# Patient Record
Sex: MALE | Race: WHITE | NOT HISPANIC OR LATINO | Employment: UNEMPLOYED | URBAN - NONMETROPOLITAN AREA
[De-identification: names, ages, dates, MRNs, and addresses within clinical notes are randomized per-mention and may not be internally consistent; named-entity substitution may affect disease eponyms.]

---

## 2020-11-08 ENCOUNTER — APPOINTMENT (OUTPATIENT)
Dept: GENERAL RADIOLOGY | Facility: HOSPITAL | Age: 20
End: 2020-11-08

## 2020-11-08 ENCOUNTER — HOSPITAL ENCOUNTER (EMERGENCY)
Facility: HOSPITAL | Age: 20
Discharge: HOME | End: 2020-11-08
Attending: FAMILY MEDICINE | Admitting: FAMILY MEDICINE

## 2020-11-08 VITALS
DIASTOLIC BLOOD PRESSURE: 74 MMHG | OXYGEN SATURATION: 98 % | WEIGHT: 205 LBS | BODY MASS INDEX: 24.96 KG/M2 | HEIGHT: 76 IN | HEART RATE: 88 BPM | SYSTOLIC BLOOD PRESSURE: 123 MMHG | TEMPERATURE: 98.8 F | RESPIRATION RATE: 20 BRPM

## 2020-11-08 DIAGNOSIS — S93.402A SPRAIN OF LEFT ANKLE, UNSPECIFIED LIGAMENT, INITIAL ENCOUNTER: Primary | ICD-10-CM

## 2020-11-08 PROCEDURE — 73630 X-RAY EXAM OF FOOT: CPT

## 2020-11-08 PROCEDURE — 73610 X-RAY EXAM OF ANKLE: CPT

## 2020-11-08 PROCEDURE — 96372 THER/PROPH/DIAG INJ SC/IM: CPT

## 2020-11-08 PROCEDURE — 99283 EMERGENCY DEPT VISIT LOW MDM: CPT

## 2020-11-08 PROCEDURE — 25010000002 KETOROLAC TROMETHAMINE PER 15 MG: Performed by: FAMILY MEDICINE

## 2020-11-08 RX ORDER — MELOXICAM 15 MG/1
15 TABLET ORAL DAILY
Qty: 30 TABLET | Refills: 0 | Status: SHIPPED | OUTPATIENT
Start: 2020-11-08 | End: 2020-11-10

## 2020-11-08 RX ORDER — KETOROLAC TROMETHAMINE 30 MG/ML
60 INJECTION, SOLUTION INTRAMUSCULAR; INTRAVENOUS ONCE
Status: COMPLETED | OUTPATIENT
Start: 2020-11-08 | End: 2020-11-08

## 2020-11-08 RX ADMIN — KETOROLAC TROMETHAMINE 60 MG: 60 INJECTION, SOLUTION INTRAMUSCULAR at 14:59

## 2020-11-08 NOTE — ED PROVIDER NOTES
"Subjective   Patient states that roughly 1 hour prior to coming to the emergency department, he was playing basketball and went for rebound, and when he landed on his left ankle, he heard a \"pop.\"  He presents emergency department with left ankle swelling, and tenderness.      Leg Pain  Location:  Ankle and foot  Time since incident:  1 hour  Injury: yes    Mechanism of injury: fall    Fall:     Fall occurred:  Running    Height of fall:  GLF    Point of impact:  Unable to specify    Entrapped after fall: no    Ankle location:  L ankle  Foot location:  L foot  Pain details:     Quality:  Aching    Radiates to:  Does not radiate    Severity:  Moderate    Onset quality:  Sudden    Duration:  1 hour    Timing:  Constant    Progression:  Waxing and waning  Chronicity:  New  Dislocation: no    Foreign body present:  No foreign bodies  Prior injury to area:  No  Relieved by:  Nothing  Worsened by:  Bearing weight  Ineffective treatments:  None tried  Associated symptoms: decreased ROM and swelling    Associated symptoms: no fatigue, no fever and no neck pain    Risk factors: no frequent fractures and no obesity        Review of Systems   Constitutional: Negative for appetite change, chills, diaphoresis, fatigue and fever.   HENT: Negative for congestion, ear discharge, ear pain, nosebleeds, rhinorrhea, sinus pressure, sore throat and trouble swallowing.    Eyes: Negative for discharge and redness.   Respiratory: Negative for apnea, cough, chest tightness, shortness of breath and wheezing.    Cardiovascular: Negative for chest pain.   Gastrointestinal: Negative for abdominal pain, diarrhea, nausea and vomiting.   Endocrine: Negative for polyuria.   Genitourinary: Negative for dysuria, frequency and urgency.   Musculoskeletal: Positive for arthralgias and joint swelling. Negative for myalgias and neck pain.   Skin: Negative for color change and rash.   Allergic/Immunologic: Negative for immunocompromised state. "   Neurological: Negative for dizziness, seizures, syncope, weakness, light-headedness and headaches.   Hematological: Negative for adenopathy. Does not bruise/bleed easily.   Psychiatric/Behavioral: Negative for behavioral problems and confusion.   All other systems reviewed and are negative.      History reviewed. No pertinent past medical history.    Allergies   Allergen Reactions   • Zoloft [Sertraline Hcl] Other (See Comments)             History reviewed. No pertinent surgical history.    History reviewed. No pertinent family history.    Social History     Socioeconomic History   • Marital status: Single     Spouse name: Not on file   • Number of children: Not on file   • Years of education: Not on file   • Highest education level: Not on file   Tobacco Use   • Smoking status: Current Every Day Smoker     Types: Cigarettes   Substance and Sexual Activity   • Alcohol use: Not Currently   • Drug use: Not Currently           Objective   Physical Exam  Vitals signs and nursing note reviewed.   Constitutional:       Appearance: He is well-developed.   HENT:      Head: Normocephalic and atraumatic.      Nose: Nose normal.   Eyes:      General: No scleral icterus.        Right eye: No discharge.         Left eye: No discharge.      Conjunctiva/sclera: Conjunctivae normal.      Pupils: Pupils are equal, round, and reactive to light.   Neck:      Musculoskeletal: Normal range of motion and neck supple.      Trachea: No tracheal deviation.   Cardiovascular:      Rate and Rhythm: Normal rate and regular rhythm.      Heart sounds: Normal heart sounds. No murmur.   Pulmonary:      Effort: Pulmonary effort is normal. No respiratory distress.      Breath sounds: Normal breath sounds. No stridor. No wheezing or rales.   Abdominal:      General: Bowel sounds are normal. There is no distension.      Palpations: Abdomen is soft. There is no mass.      Tenderness: There is no abdominal tenderness. There is no guarding or rebound.    Musculoskeletal:      Left foot: Decreased range of motion. Tenderness, bony tenderness and swelling present. No crepitus or laceration.        Feet:    Skin:     General: Skin is warm and dry.      Findings: No erythema or rash.   Neurological:      Mental Status: He is alert and oriented to person, place, and time.      Coordination: Coordination normal.   Psychiatric:         Behavior: Behavior normal.         Thought Content: Thought content normal.         Procedures           ED Course                   Labs Reviewed - No data to display    XR Foot 3+ View Left   Final Result   Soft tissue swelling without acute osseous   abnormality identified.          Note:  if pain or symptoms persist beyond reasonable expectations      and follow-up imaging is anticipated,  cross sectional imaging    (CT and/or MRI) is suggested, as is deemed clinically    appropriate.      Electronically signed by:  Melissa Barnes MD  11/8/2020 3:18 PM CST   Workstation: [id number]      XR Ankle 3+ View Left   Final Result   Soft tissue swelling without acute osseous   abnormality identified.         Note:  if pain or symptoms persist beyond reasonable expectations      and follow-up imaging is anticipated,  cross sectional imaging    (CT and/or MRI) is suggested, as is deemed clinically    appropriate.      Electronically signed by:  Melissa Barnes MD  11/8/2020 3:16 PM CST   Workstation: [id number]                                       [facility]    Final diagnoses:   Sprain of left ankle, unspecified ligament, initial encounter            Anders Dover MD  11/08/20 7443

## 2020-11-10 ENCOUNTER — HOSPITAL ENCOUNTER (EMERGENCY)
Facility: HOSPITAL | Age: 20
Discharge: HOME | End: 2020-11-11
Attending: EMERGENCY MEDICINE | Admitting: EMERGENCY MEDICINE

## 2020-11-10 VITALS
TEMPERATURE: 98.4 F | RESPIRATION RATE: 17 BRPM | BODY MASS INDEX: 24.96 KG/M2 | DIASTOLIC BLOOD PRESSURE: 57 MMHG | OXYGEN SATURATION: 99 % | HEIGHT: 76 IN | SYSTOLIC BLOOD PRESSURE: 128 MMHG | WEIGHT: 205 LBS | HEART RATE: 60 BPM

## 2020-11-10 DIAGNOSIS — F32.A DEPRESSION WITH SUICIDAL IDEATION: Primary | ICD-10-CM

## 2020-11-10 DIAGNOSIS — R45.851 DEPRESSION WITH SUICIDAL IDEATION: Primary | ICD-10-CM

## 2020-11-10 LAB
ALBUMIN SERPL-MCNC: 4.5 G/DL (ref 3.5–5.2)
ALBUMIN/GLOB SERPL: 1.9 G/DL
ALP SERPL-CCNC: 74 U/L (ref 39–117)
ALT SERPL W P-5'-P-CCNC: 15 U/L (ref 1–41)
AMPHET+METHAMPHET UR QL: NEGATIVE
AMPHETAMINES UR QL: NEGATIVE
ANION GAP SERPL CALCULATED.3IONS-SCNC: 11 MMOL/L (ref 5–15)
APAP SERPL-MCNC: <5 MCG/ML (ref 0–30)
AST SERPL-CCNC: 23 U/L (ref 1–40)
BARBITURATES UR QL SCN: NEGATIVE
BASOPHILS # BLD AUTO: 0.03 10*3/MM3 (ref 0–0.2)
BASOPHILS NFR BLD AUTO: 0.4 % (ref 0–1.5)
BENZODIAZ UR QL SCN: NEGATIVE
BILIRUB SERPL-MCNC: 0.5 MG/DL (ref 0–1.2)
BILIRUB UR QL STRIP: NEGATIVE
BUN SERPL-MCNC: 13 MG/DL (ref 6–20)
BUN/CREAT SERPL: 16.7 (ref 7–25)
BUPRENORPHINE SERPL-MCNC: NEGATIVE NG/ML
CALCIUM SPEC-SCNC: 9.6 MG/DL (ref 8.6–10.5)
CANNABINOIDS SERPL QL: NEGATIVE
CHLORIDE SERPL-SCNC: 104 MMOL/L (ref 98–107)
CLARITY UR: CLEAR
CO2 SERPL-SCNC: 25 MMOL/L (ref 22–29)
COCAINE UR QL: NEGATIVE
COLOR UR: YELLOW
CREAT SERPL-MCNC: 0.78 MG/DL (ref 0.76–1.27)
DEPRECATED RDW RBC AUTO: 41.1 FL (ref 37–54)
EOSINOPHIL # BLD AUTO: 0.17 10*3/MM3 (ref 0–0.4)
EOSINOPHIL NFR BLD AUTO: 2.5 % (ref 0.3–6.2)
ERYTHROCYTE [DISTWIDTH] IN BLOOD BY AUTOMATED COUNT: 11.8 % (ref 12.3–15.4)
ETHANOL BLD-MCNC: <10 MG/DL (ref 0–10)
ETHANOL UR QL: <0.01 %
GFR SERPL CREATININE-BSD FRML MDRD: 127 ML/MIN/1.73
GLOBULIN UR ELPH-MCNC: 2.4 GM/DL
GLUCOSE SERPL-MCNC: 97 MG/DL (ref 65–99)
GLUCOSE UR STRIP-MCNC: NEGATIVE MG/DL
HCT VFR BLD AUTO: 37.5 % (ref 37.5–51)
HGB BLD-MCNC: 13 G/DL (ref 13–17.7)
HGB UR QL STRIP.AUTO: NEGATIVE
HOLD SPECIMEN: NORMAL
IMM GRANULOCYTES # BLD AUTO: 0.01 10*3/MM3 (ref 0–0.05)
IMM GRANULOCYTES NFR BLD AUTO: 0.1 % (ref 0–0.5)
KETONES UR QL STRIP: ABNORMAL
LEUKOCYTE ESTERASE UR QL STRIP.AUTO: NEGATIVE
LYMPHOCYTES # BLD AUTO: 2.25 10*3/MM3 (ref 0.7–3.1)
LYMPHOCYTES NFR BLD AUTO: 32.6 % (ref 19.6–45.3)
MCH RBC QN AUTO: 32.3 PG (ref 26.6–33)
MCHC RBC AUTO-ENTMCNC: 34.7 G/DL (ref 31.5–35.7)
MCV RBC AUTO: 93.3 FL (ref 79–97)
METHADONE UR QL SCN: NEGATIVE
MONOCYTES # BLD AUTO: 0.61 10*3/MM3 (ref 0.1–0.9)
MONOCYTES NFR BLD AUTO: 8.8 % (ref 5–12)
NEUTROPHILS NFR BLD AUTO: 3.84 10*3/MM3 (ref 1.7–7)
NEUTROPHILS NFR BLD AUTO: 55.6 % (ref 42.7–76)
NITRITE UR QL STRIP: NEGATIVE
NRBC BLD AUTO-RTO: 0 /100 WBC (ref 0–0.2)
OPIATES UR QL: NEGATIVE
OXYCODONE UR QL SCN: NEGATIVE
PCP UR QL SCN: NEGATIVE
PH UR STRIP.AUTO: 6 [PH] (ref 5–9)
PLATELET # BLD AUTO: 276 10*3/MM3 (ref 140–450)
PMV BLD AUTO: 9.9 FL (ref 6–12)
POTASSIUM SERPL-SCNC: 4.2 MMOL/L (ref 3.5–5.2)
PROPOXYPH UR QL: NEGATIVE
PROT SERPL-MCNC: 6.9 G/DL (ref 6–8.5)
PROT UR QL STRIP: NEGATIVE
RBC # BLD AUTO: 4.02 10*6/MM3 (ref 4.14–5.8)
SALICYLATES SERPL-MCNC: <0.3 MG/DL
SODIUM SERPL-SCNC: 140 MMOL/L (ref 136–145)
SP GR UR STRIP: 1.01 (ref 1–1.03)
TRICYCLICS UR QL SCN: NEGATIVE
UROBILINOGEN UR QL STRIP: ABNORMAL
WBC # BLD AUTO: 6.91 10*3/MM3 (ref 3.4–10.8)
WHOLE BLOOD HOLD SPECIMEN: NORMAL

## 2020-11-10 PROCEDURE — 85025 COMPLETE CBC W/AUTO DIFF WBC: CPT | Performed by: EMERGENCY MEDICINE

## 2020-11-10 PROCEDURE — 80307 DRUG TEST PRSMV CHEM ANLYZR: CPT | Performed by: EMERGENCY MEDICINE

## 2020-11-10 PROCEDURE — 99284 EMERGENCY DEPT VISIT MOD MDM: CPT

## 2020-11-10 PROCEDURE — 81003 URINALYSIS AUTO W/O SCOPE: CPT | Performed by: EMERGENCY MEDICINE

## 2020-11-10 PROCEDURE — 80053 COMPREHEN METABOLIC PANEL: CPT | Performed by: EMERGENCY MEDICINE

## 2020-11-10 RX ORDER — BUPROPION HYDROCHLORIDE 150 MG/1
150 TABLET, EXTENDED RELEASE ORAL ONCE
Status: COMPLETED | OUTPATIENT
Start: 2020-11-10 | End: 2020-11-11

## 2020-11-10 RX ORDER — BUPROPION HYDROCHLORIDE 150 MG/1
150 TABLET, EXTENDED RELEASE ORAL DAILY
Qty: 10 TABLET | Refills: 0 | Status: SHIPPED | OUTPATIENT
Start: 2020-11-10 | End: 2020-11-16 | Stop reason: HOSPADM

## 2020-11-11 ENCOUNTER — HOSPITAL ENCOUNTER (INPATIENT)
Facility: HOSPITAL | Age: 20
LOS: 5 days | Discharge: HOME | End: 2020-11-16
Attending: PSYCHIATRY & NEUROLOGY | Admitting: PSYCHIATRY & NEUROLOGY

## 2020-11-11 ENCOUNTER — HOSPITAL ENCOUNTER (EMERGENCY)
Facility: HOSPITAL | Age: 20
Discharge: PSYCHIATRIC OR SUBSTANCE USE FACILITY | End: 2020-11-11
Attending: EMERGENCY MEDICINE

## 2020-11-11 VITALS
DIASTOLIC BLOOD PRESSURE: 73 MMHG | BODY MASS INDEX: 24.96 KG/M2 | OXYGEN SATURATION: 98 % | WEIGHT: 205 LBS | HEART RATE: 63 BPM | RESPIRATION RATE: 18 BRPM | TEMPERATURE: 97.5 F | SYSTOLIC BLOOD PRESSURE: 126 MMHG | HEIGHT: 76 IN

## 2020-11-11 DIAGNOSIS — F32.A DEPRESSION, UNSPECIFIED DEPRESSION TYPE: Primary | ICD-10-CM

## 2020-11-11 DIAGNOSIS — R45.851 SUICIDAL IDEATION: ICD-10-CM

## 2020-11-11 LAB
ALBUMIN SERPL-MCNC: 4.9 G/DL (ref 3.5–5.2)
ALBUMIN/GLOB SERPL: 1.8 G/DL
ALP SERPL-CCNC: 84 U/L (ref 39–117)
ALT SERPL W P-5'-P-CCNC: 19 U/L (ref 1–41)
AMPHET+METHAMPHET UR QL: NEGATIVE
AMPHETAMINES UR QL: NEGATIVE
ANION GAP SERPL CALCULATED.3IONS-SCNC: 10 MMOL/L (ref 5–15)
APAP SERPL-MCNC: <5 MCG/ML (ref 0–30)
AST SERPL-CCNC: 24 U/L (ref 1–40)
B PARAPERT DNA SPEC QL NAA+PROBE: NOT DETECTED
B PERT DNA SPEC QL NAA+PROBE: NOT DETECTED
BARBITURATES UR QL SCN: NEGATIVE
BASOPHILS # BLD AUTO: 0.04 10*3/MM3 (ref 0–0.2)
BASOPHILS NFR BLD AUTO: 0.5 % (ref 0–1.5)
BENZODIAZ UR QL SCN: NEGATIVE
BILIRUB SERPL-MCNC: 0.5 MG/DL (ref 0–1.2)
BILIRUB UR QL STRIP: NEGATIVE
BUN SERPL-MCNC: 12 MG/DL (ref 6–20)
BUN/CREAT SERPL: 16.7 (ref 7–25)
BUPRENORPHINE SERPL-MCNC: NEGATIVE NG/ML
C PNEUM DNA NPH QL NAA+NON-PROBE: NOT DETECTED
CALCIUM SPEC-SCNC: 9.9 MG/DL (ref 8.6–10.5)
CANNABINOIDS SERPL QL: NEGATIVE
CHLORIDE SERPL-SCNC: 104 MMOL/L (ref 98–107)
CLARITY UR: CLEAR
CO2 SERPL-SCNC: 26 MMOL/L (ref 22–29)
COCAINE UR QL: NEGATIVE
COLOR UR: YELLOW
CREAT SERPL-MCNC: 0.72 MG/DL (ref 0.76–1.27)
DEPRECATED RDW RBC AUTO: 39.8 FL (ref 37–54)
EOSINOPHIL # BLD AUTO: 0.17 10*3/MM3 (ref 0–0.4)
EOSINOPHIL NFR BLD AUTO: 2.1 % (ref 0.3–6.2)
ERYTHROCYTE [DISTWIDTH] IN BLOOD BY AUTOMATED COUNT: 11.8 % (ref 12.3–15.4)
ETHANOL BLD-MCNC: <10 MG/DL (ref 0–10)
ETHANOL UR QL: <0.01 %
FLUAV H1 2009 PAND RNA NPH QL NAA+PROBE: NOT DETECTED
FLUAV H1 HA GENE NPH QL NAA+PROBE: NOT DETECTED
FLUAV H3 RNA NPH QL NAA+PROBE: NOT DETECTED
FLUAV SUBTYP SPEC NAA+PROBE: NOT DETECTED
FLUBV RNA ISLT QL NAA+PROBE: NOT DETECTED
GFR SERPL CREATININE-BSD FRML MDRD: 139 ML/MIN/1.73
GLOBULIN UR ELPH-MCNC: 2.8 GM/DL
GLUCOSE SERPL-MCNC: 95 MG/DL (ref 65–99)
GLUCOSE UR STRIP-MCNC: NEGATIVE MG/DL
HADV DNA SPEC NAA+PROBE: NOT DETECTED
HCOV 229E RNA SPEC QL NAA+PROBE: NOT DETECTED
HCOV HKU1 RNA SPEC QL NAA+PROBE: NOT DETECTED
HCOV NL63 RNA SPEC QL NAA+PROBE: NOT DETECTED
HCOV OC43 RNA SPEC QL NAA+PROBE: NOT DETECTED
HCT VFR BLD AUTO: 41.8 % (ref 37.5–51)
HGB BLD-MCNC: 14.8 G/DL (ref 13–17.7)
HGB UR QL STRIP.AUTO: NEGATIVE
HMPV RNA NPH QL NAA+NON-PROBE: NOT DETECTED
HOLD SPECIMEN: NORMAL
HPIV1 RNA SPEC QL NAA+PROBE: NOT DETECTED
HPIV2 RNA SPEC QL NAA+PROBE: NOT DETECTED
HPIV3 RNA NPH QL NAA+PROBE: NOT DETECTED
HPIV4 P GENE NPH QL NAA+PROBE: NOT DETECTED
IMM GRANULOCYTES # BLD AUTO: 0.03 10*3/MM3 (ref 0–0.05)
IMM GRANULOCYTES NFR BLD AUTO: 0.4 % (ref 0–0.5)
KETONES UR QL STRIP: NEGATIVE
LEUKOCYTE ESTERASE UR QL STRIP.AUTO: NEGATIVE
LYMPHOCYTES # BLD AUTO: 1.63 10*3/MM3 (ref 0.7–3.1)
LYMPHOCYTES NFR BLD AUTO: 20.5 % (ref 19.6–45.3)
M PNEUMO IGG SER IA-ACNC: NOT DETECTED
MCH RBC QN AUTO: 32.4 PG (ref 26.6–33)
MCHC RBC AUTO-ENTMCNC: 35.4 G/DL (ref 31.5–35.7)
MCV RBC AUTO: 91.5 FL (ref 79–97)
METHADONE UR QL SCN: NEGATIVE
MONOCYTES # BLD AUTO: 0.61 10*3/MM3 (ref 0.1–0.9)
MONOCYTES NFR BLD AUTO: 7.7 % (ref 5–12)
NEUTROPHILS NFR BLD AUTO: 5.48 10*3/MM3 (ref 1.7–7)
NEUTROPHILS NFR BLD AUTO: 68.8 % (ref 42.7–76)
NITRITE UR QL STRIP: NEGATIVE
NRBC BLD AUTO-RTO: 0 /100 WBC (ref 0–0.2)
OPIATES UR QL: NEGATIVE
OXYCODONE UR QL SCN: NEGATIVE
PCP UR QL SCN: NEGATIVE
PH UR STRIP.AUTO: 6 [PH] (ref 5–9)
PLATELET # BLD AUTO: 297 10*3/MM3 (ref 140–450)
PMV BLD AUTO: 10.2 FL (ref 6–12)
POTASSIUM SERPL-SCNC: 3.8 MMOL/L (ref 3.5–5.2)
PROPOXYPH UR QL: NEGATIVE
PROT SERPL-MCNC: 7.7 G/DL (ref 6–8.5)
PROT UR QL STRIP: NEGATIVE
RBC # BLD AUTO: 4.57 10*6/MM3 (ref 4.14–5.8)
RHINOVIRUS RNA SPEC NAA+PROBE: NOT DETECTED
RSV RNA NPH QL NAA+NON-PROBE: NOT DETECTED
SALICYLATES SERPL-MCNC: 0.4 MG/DL
SARS-COV-2 RNA NPH QL NAA+NON-PROBE: NOT DETECTED
SODIUM SERPL-SCNC: 140 MMOL/L (ref 136–145)
SP GR UR STRIP: 1 (ref 1–1.03)
TRICYCLICS UR QL SCN: NEGATIVE
UROBILINOGEN UR QL STRIP: ABNORMAL
WBC # BLD AUTO: 7.96 10*3/MM3 (ref 3.4–10.8)
WHOLE BLOOD HOLD SPECIMEN: NORMAL

## 2020-11-11 PROCEDURE — 80307 DRUG TEST PRSMV CHEM ANLYZR: CPT | Performed by: EMERGENCY MEDICINE

## 2020-11-11 PROCEDURE — 80053 COMPREHEN METABOLIC PANEL: CPT | Performed by: EMERGENCY MEDICINE

## 2020-11-11 PROCEDURE — 0202U NFCT DS 22 TRGT SARS-COV-2: CPT | Performed by: EMERGENCY MEDICINE

## 2020-11-11 PROCEDURE — 81003 URINALYSIS AUTO W/O SCOPE: CPT | Performed by: EMERGENCY MEDICINE

## 2020-11-11 PROCEDURE — 85025 COMPLETE CBC W/AUTO DIFF WBC: CPT | Performed by: EMERGENCY MEDICINE

## 2020-11-11 RX ORDER — NICOTINE 21 MG/24HR
1 PATCH, TRANSDERMAL 24 HOURS TRANSDERMAL EVERY 24 HOURS
Status: DISCONTINUED | OUTPATIENT
Start: 2020-11-12 | End: 2020-11-12

## 2020-11-11 RX ORDER — LOPERAMIDE HYDROCHLORIDE 2 MG/1
2 CAPSULE ORAL
Status: DISCONTINUED | OUTPATIENT
Start: 2020-11-11 | End: 2020-11-16 | Stop reason: HOSPADM

## 2020-11-11 RX ORDER — ACETAMINOPHEN 325 MG/1
650 TABLET ORAL EVERY 4 HOURS PRN
Status: DISCONTINUED | OUTPATIENT
Start: 2020-11-11 | End: 2020-11-16 | Stop reason: HOSPADM

## 2020-11-11 RX ORDER — ALUMINA, MAGNESIA, AND SIMETHICONE 2400; 2400; 240 MG/30ML; MG/30ML; MG/30ML
15 SUSPENSION ORAL EVERY 6 HOURS PRN
Status: DISCONTINUED | OUTPATIENT
Start: 2020-11-11 | End: 2020-11-16 | Stop reason: HOSPADM

## 2020-11-11 RX ORDER — TRAZODONE HYDROCHLORIDE 50 MG/1
50 TABLET ORAL NIGHTLY PRN
Status: DISCONTINUED | OUTPATIENT
Start: 2020-11-11 | End: 2020-11-12

## 2020-11-11 RX ORDER — HYDROXYZINE PAMOATE 50 MG/1
50 CAPSULE ORAL EVERY 6 HOURS PRN
Status: DISCONTINUED | OUTPATIENT
Start: 2020-11-11 | End: 2020-11-16 | Stop reason: HOSPADM

## 2020-11-11 RX ORDER — CLONIDINE HYDROCHLORIDE 0.1 MG/1
0.1 TABLET ORAL EVERY 4 HOURS PRN
Status: DISCONTINUED | OUTPATIENT
Start: 2020-11-11 | End: 2020-11-16 | Stop reason: HOSPADM

## 2020-11-11 RX ADMIN — BUPROPION HYDROCHLORIDE 150 MG: 150 TABLET, EXTENDED RELEASE ORAL at 00:11

## 2020-11-11 NOTE — ED PROVIDER NOTES
Subjective   20-year-old male with reported history of depression and anxiety not currently on any medications presents the emergency department with complaint of suicidal ideation.  Denies any specific plan.  Reports he has been declining since he has been unable to take his Wellbutrin.  Reports that where he is staying at does not allow him his Wellbutrin.  Presents here for evaluation for depression and suicidal ideation.  He is living at [facility].    Family history, surgical history, social history, current medications and allergies are reviewed with the patient and triage documentation and vitals are reviewed.      History provided by:  Patient and medical records   used: No        Review of Systems   Constitutional: Negative for chills and fever.   HENT: Negative for congestion and sore throat.    Eyes: Negative for photophobia and visual disturbance.   Respiratory: Negative for cough, shortness of breath and wheezing.    Cardiovascular: Negative for chest pain, palpitations and leg swelling.   Gastrointestinal: Negative for abdominal pain, diarrhea, nausea and vomiting.   Endocrine: Negative.    Genitourinary: Negative for dysuria, frequency and urgency.   Musculoskeletal: Negative for arthralgias, back pain, myalgias and neck pain.   Skin: Negative for rash and wound.   Allergic/Immunologic: Negative.    Neurological: Negative for weakness, light-headedness, numbness and headaches.   Hematological: Negative.    Psychiatric/Behavioral: Positive for dysphoric mood and suicidal ideas. Negative for behavioral problems, hallucinations, self-injury and sleep disturbance. The patient is nervous/anxious.        Past Medical History:   Diagnosis Date   • Anxiety    • Depression        Allergies   Allergen Reactions   • Zoloft [Sertraline Hcl] Other (See Comments)             History reviewed. No pertinent surgical history.    History reviewed. No pertinent family history.    Social History      Socioeconomic History   • Marital status: Single     Spouse name: Not on file   • Number of children: Not on file   • Years of education: Not on file   • Highest education level: Not on file   Tobacco Use   • Smoking status: Never Smoker   Substance and Sexual Activity   • Alcohol use: Not Currently   • Drug use: Not Currently           Objective   Physical Exam  Vitals signs and nursing note reviewed.   Constitutional:       General: He is not in acute distress.     Appearance: Normal appearance. He is normal weight. He is not ill-appearing, toxic-appearing or diaphoretic.   HENT:      Head: Normocephalic and atraumatic.   Eyes:      Conjunctiva/sclera: Conjunctivae normal.      Pupils: Pupils are equal, round, and reactive to light.   Neck:      Musculoskeletal: Normal range of motion and neck supple.   Cardiovascular:      Rate and Rhythm: Normal rate and regular rhythm.      Pulses: Normal pulses.      Heart sounds: No murmur.   Pulmonary:      Effort: Pulmonary effort is normal.      Breath sounds: Normal breath sounds.   Abdominal:      General: Bowel sounds are normal.      Palpations: Abdomen is soft.   Musculoskeletal: Normal range of motion.   Skin:     General: Skin is warm and dry.      Capillary Refill: Capillary refill takes less than 2 seconds.   Neurological:      Mental Status: He is alert and oriented to person, place, and time.   Psychiatric:         Attention and Perception: Attention normal. He does not perceive auditory or visual hallucinations.         Mood and Affect: Mood is anxious. Affect is flat.         Speech: Speech normal.         Behavior: Behavior normal. Behavior is cooperative.         Thought Content: Thought content is not paranoid or delusional. Thought content includes suicidal ideation. Thought content does not include homicidal ideation. Thought content does not include homicidal or suicidal plan.         Cognition and Memory: Cognition and memory normal.          Procedures  none         ED Course  ED Course as of Nov 10 2347   Tue Nov 10, 2020   2343 Patient was signed out to me at shift change at 2300 by Dr. Cherry.  She medically cleared the patient.  Patient was evaluated by Tyra sosa [facility].  She will provide a safety plan.  She recommended we restart the patient on Wellbutrin.    [DR]      ED Course User Index  [DR] Eros Sam MD      Labs Reviewed   URINALYSIS W/ MICROSCOPIC IF INDICATED (NO CULTURE) - Abnormal; Notable for the following components:       Result Value    Ketones, UA 40 mg/dL (2+) (*)     All other components within normal limits    Narrative:     Urine microscopic not indicated.   CBC WITH AUTO DIFFERENTIAL - Abnormal; Notable for the following components:    RBC 4.02 (*)     RDW 11.8 (*)     All other components within normal limits   URINE DRUG SCREEN - Normal    Narrative:     Cutoff For Drugs Screened:    Amphetamines               500 ng/ml  Barbiturates               200 ng/ml  Benzodiazepines            150 ng/ml  Cocaine                    150 ng/ml  Methadone                  200 ng/ml  Opiates                    100 ng/ml  Phencyclidine               25 ng/ml  THC                            50 ng/ml  Methamphetamine            500 ng/ml  Tricyclic Antidepressants  300 ng/ml  Oxycodone                  100 ng/ml  Propoxyphene               300 ng/ml  Buprenorphine               10 ng/ml    The normal value for all drugs tested is negative. This report includes unconfirmed screening results, with the cutoff values listed, to be used for medical treatment purposes only.  Unconfirmed results must not be used for non-medical purposes such as employment or legal testing.  Clinical consideration should be applied to any drug of abuse test, particularly when unconfirmed results are used.     ACETAMINOPHEN LEVEL - Normal   SALICYLATE LEVEL - Normal   COMPREHENSIVE METABOLIC PANEL    Narrative:     GFR Normal >60  Chronic Kidney  Disease <60  Kidney Failure <15     ETHANOL   CBC AND DIFFERENTIAL    Narrative:     The following orders were created for panel order CBC & Differential.  Procedure                               Abnormality         Status                     ---------                               -----------         ------                     CBC Auto Differential[[id number]]        Abnormal            Final result                 Please view results for these tests on the individual orders.   EXTRA TUBES    Narrative:     The following orders were created for panel order Extra Tubes.  Procedure                               Abnormality         Status                     ---------                               -----------         ------                     Light Blue Top[[id number]]                                   Final result               Gold Top - SST[[id number]]                                   Final result                 Please view results for these tests on the individual orders.   LIGHT BLUE TOP   GOLD TOP - SST     Xr Ankle 3+ View Left    Result Date: 11/8/2020  Narrative: PROCEDURE: XR ANKLE 3+ VW LEFT VIEWS: 3 INDICATION: Injury, twisting injury COMPARISON: None FINDINGS:   - fracture: None   - alignment: Within normal limits   - misc: Soft tissue swelling overlies the lateral malleolus. There may be a very small joint effusion     Impression: Soft tissue swelling without acute osseous abnormality identified. Note:  if pain or symptoms persist beyond reasonable expectations and follow-up imaging is anticipated,  cross sectional imaging  (CT and/or MRI) is suggested, as is deemed clinically appropriate. Electronically signed by:  Melissa Barnes MD  11/8/2020 3:16 PM [facility] Workstation: [id number]    Xr Foot 3+ View Left    Result Date: 11/8/2020  Narrative: PROCEDURE: XR FOOT 3+ VW LEFT VIEWS: 3 INDICATION: Pain COMPARISON: None FINDINGS:   - fracture: None   - alignment: Within normal limits   - misc: Soft tissue  swelling overlies the lateral malleolus. There may be a small joint effusion at the tibiotalar joint     Impression: Soft tissue swelling without acute osseous abnormality identified. Note:  if pain or symptoms persist beyond reasonable expectations and follow-up imaging is anticipated,  cross sectional imaging  (CT and/or MRI) is suggested, as is deemed clinically appropriate. Electronically signed by:  Melissa Barnes MD  11/8/2020 3:18 PM [facility] Workstation: [id number]          MDM  Number of Diagnoses or Management Options     Amount and/or Complexity of Data Reviewed  Clinical lab tests: reviewed  Discuss the patient with other providers: yes    Patient Progress  Patient progress: stable    2300 patient has been medically cleared.  Due to lack of available beds patient is evaluated by [facility].  At the time of the end of my shift patient is actively being evaluated.  Patient will be signed out to Dr. Sam.  Please see his documentation for final disposition.    Final diagnoses:   Depression with suicidal ideation            Eros Sam MD  11/10/20 8229

## 2020-11-11 NOTE — ED NOTES
Pt taken to Conference room with sitter for [facility] evaluation     Trish Austin, RN  11/10/20 [id number]

## 2020-11-11 NOTE — ED NOTES
Pt called [facility] at facility after this RN attempted calling facility with no answer.  Colt sts to pt he will get someone here to pick him up.  Charge RN sts sitter is relieved and pt may be discharged to waiting to await ride.     Trish Austin, RN  11/11/20 0008

## 2020-11-12 PROBLEM — F15.90 STIMULANT USE DISORDER: Status: ACTIVE | Noted: 2020-11-12

## 2020-11-12 PROBLEM — F33.2 MDD (MAJOR DEPRESSIVE DISORDER), RECURRENT SEVERE, WITHOUT PSYCHOSIS (HCC): Status: ACTIVE | Noted: 2020-11-12

## 2020-11-12 PROBLEM — F32.A ACUTE DEPRESSION: Status: ACTIVE | Noted: 2020-11-12

## 2020-11-12 PROBLEM — F19.94 SUBSTANCE INDUCED MOOD DISORDER (HCC): Status: ACTIVE | Noted: 2020-11-12

## 2020-11-12 LAB
CHOLEST SERPL-MCNC: 137 MG/DL (ref 0–200)
GLUCOSE P FAST SERPL-MCNC: 107 MG/DL (ref 74–106)
HDLC SERPL-MCNC: 44 MG/DL (ref 40–60)
HOLD SPECIMEN: NORMAL
LDLC SERPL CALC-MCNC: 81 MG/DL (ref 0–100)
LDLC/HDLC SERPL: 1.85 {RATIO}
QT INTERVAL: 418 MS
QTC INTERVAL: 396 MS
TRIGL SERPL-MCNC: 58 MG/DL (ref 0–150)
VLDLC SERPL-MCNC: 12 MG/DL (ref 5–40)
WHOLE BLOOD HOLD SPECIMEN: NORMAL

## 2020-11-12 PROCEDURE — 99223 1ST HOSP IP/OBS HIGH 75: CPT | Performed by: PSYCHIATRY & NEUROLOGY

## 2020-11-12 PROCEDURE — 82947 ASSAY GLUCOSE BLOOD QUANT: CPT | Performed by: PSYCHIATRY & NEUROLOGY

## 2020-11-12 PROCEDURE — 93010 ELECTROCARDIOGRAM REPORT: CPT | Performed by: INTERNAL MEDICINE

## 2020-11-12 PROCEDURE — 80061 LIPID PANEL: CPT | Performed by: PSYCHIATRY & NEUROLOGY

## 2020-11-12 PROCEDURE — 93005 ELECTROCARDIOGRAM TRACING: CPT | Performed by: PSYCHIATRY & NEUROLOGY

## 2020-11-12 RX ORDER — LANOLIN ALCOHOL/MO/W.PET/CERES
1.5 CREAM (GRAM) TOPICAL DAILY PRN
Status: DISCONTINUED | OUTPATIENT
Start: 2020-11-12 | End: 2020-11-16 | Stop reason: HOSPADM

## 2020-11-12 RX ORDER — MIRTAZAPINE 15 MG/1
15 TABLET, FILM COATED ORAL NIGHTLY
Status: DISCONTINUED | OUTPATIENT
Start: 2020-11-12 | End: 2020-11-16 | Stop reason: HOSPADM

## 2020-11-12 RX ORDER — BUPROPION HYDROCHLORIDE 100 MG/1
100 TABLET, EXTENDED RELEASE ORAL DAILY
Status: COMPLETED | OUTPATIENT
Start: 2020-11-12 | End: 2020-11-12

## 2020-11-12 RX ORDER — BUPROPION HYDROCHLORIDE 150 MG/1
150 TABLET ORAL DAILY
Status: DISCONTINUED | OUTPATIENT
Start: 2020-11-13 | End: 2020-11-16 | Stop reason: HOSPADM

## 2020-11-12 RX ADMIN — MELATONIN 1.5 MG: at 20:42

## 2020-11-12 RX ADMIN — HYDROXYZINE PAMOATE 50 MG: 50 CAPSULE ORAL at 20:41

## 2020-11-12 RX ADMIN — MIRTAZAPINE 15 MG: 15 TABLET, FILM COATED ORAL at 20:42

## 2020-11-12 RX ADMIN — NICOTINE POLACRILEX 2 MG: 2 GUM, CHEWING BUCCAL at 20:42

## 2020-11-12 RX ADMIN — BUPROPION HYDROCHLORIDE 100 MG: 100 TABLET, EXTENDED RELEASE ORAL at 12:26

## 2020-11-12 RX ADMIN — NICOTINE POLACRILEX 2 MG: 2 GUM, CHEWING BUCCAL at 12:38

## 2020-11-12 RX ADMIN — NICOTINE 1 PATCH: 14 PATCH, EXTENDED RELEASE TRANSDERMAL at 08:28

## 2020-11-12 NOTE — PLAN OF CARE
Goal Outcome Evaluation:  Plan of Care Reviewed With: patient  Progress: no change  Outcome Summary: New admit tonight. patient has slept approx 4 hours tonight with no issues. Will continue to monitor.

## 2020-11-12 NOTE — PLAN OF CARE
"LCSW met with pt 1:1 and completed psychosocial assessment. Pt presented to the interview room, casually dressed, alert and oriented x3. Mood is depressed and hopeless, affect is blunted. Pt makes fair eye contact, speech is normal. Pt is cooperative with assessment, engages fairly well. Re: Reason for admission, pt stated \"I got out of [facility] a few weeks ago and went to [facility]. They wont let me take my medicine there and things have just gotten bad. I started having suicidal thoughts and I tried to cut myself the other day.\" Pt notes that he initially was brought to the ED two days ago but \"lied because I didn't want to get admitted\" and was sent back to [facility]. Pt reports that he continued to have SI so he came back to the hospital. Pt notes \"I just dont want to live. I want to go to sleep and not wake up. I dont feel like I have any options other than suicide or [facility].\" Pt reports that he has a long hx of anxiety and depression, dating back to early childhood when his mother passed from cancer. Pt notes that he \"had a crappy life\" because of parents' addiction issues. Pt notes that he started using meth and alcohol at age 14 and has continued until 4 months ago when he was arrested for receiving stolen property. PT does note that he has used alcohol once since being at [facility]. Pt seems to be ambivalent with re: to wanting to be in treatment, seems to say that he is only there due to being court ordered. Pt reports having no social support and that he feels isolated and alone. Engaged pt in motivational interviewing today. Provided supportive therapy and reflective listening. PT continues to report having SI this morning, but says he feels safe on the unit. Plan will be to engage pt in individual and group MD michael to address med management. Tx team will meet and develop tx plan. LCSW to follow up accordingly.    Mental Status Exam:    Hygiene:   fair  Cooperation:  Cooperative  Eye Contact:  " "Fair  Psychomotor Behavior:  Appropriate  Affect:  Blunted  Speech:  Normal  Thought Progress:  Goal directed  Thought Content:  Mood congruent  Suicidal:  Suicidal Ideation  Homicidal:  None  Hallucinations:  None  Delusion:  None  Memory:  Intact  Orientation:  Person, Place, Time, and Situation  Reliability:  fair  Insight:  Fair  Judgement:  Fair  Impulse Control:  Fair    Goals for treatment: \"I want back on my meds.\"    Prior Hospitalizations / Dates    1.None    Childhood History: Raised by parents, who were both addicts. Reports being transient much of late childhood. Mother passed from cancer early on.    Suicide Attempts: Cut wrist at age 14, recently cut wrist 2 days prior to admission    Alcohol: long hx of abuse,  Cannabis: does not use, Methamphetamine: long hx of snorting, Opiate: does not use, Cocaine: does not use, and Synthetic: does not use    Sexual: heterosexual, Marital Status: single, Living situation: [facility], and Occupation: unemployed    History of physical abuse: no, History of sexual abuse: no, and History of verbal/emotional abuse: no    11th grade    Access to firearms: Denies    Past Medical History:   Diagnosis Date    Anxiety     Depression              Problem: Adult Behavioral Health Plan of Care  Goal: Patient-Specific Goal (Individualization)  Recent Flowsheet Documentation  Taken 11/12/2020 0800 by Sunil Isabel LCSW  Patient Personal Strengths:   tolerant   self-reliant   self-awareness   resourceful   resilient   expressive of needs   expressive of emotions  Patient Vulnerabilities:   substance abuse/addiction   limited support system   lacks insight into illness  Goal: Develops/Participates in [facility] [place] to Support Successful Transition  Intervention: Mutually Develop Transition Plan  Recent Flowsheet Documentation  Taken 11/12/2020 0800 by Sunil Isabel LCSW  Outpatient/Agency/Support Group Needs:   outpatient counseling   outpatient medication management   " outpatient psychiatric care (specify)   residential services   12 step program (specify)  Transportation Anticipated: agency  Anticipated Discharge Disposition: residential substance use unit  Transportation Concerns: car, none  Concerns to be Addressed:   substance/tobacco abuse/use   mental health   medication   coping/stress   compliance issue  Readmission Within the Last 30 Days: no previous admission in last 30 days  Patient/Family Anticipated Services at Transition: mental health services  Patient/Family Anticipates Transition to: inpatient rehabilitation facility

## 2020-11-12 NOTE — ED PROVIDER NOTES
"Subjective   20-year-old male presents to the emergency department with complaints of depression and suicidal ideation.  He also reports history of cutting himself and states that he tried to cut himself with a pocket knife today but it was \"too dull\".  Superficial abrasions to the left anterior forearm reported.  He was seen in this emergency department 24 hours ago for depression and suicidal ideation that he reports worsened after where [facility] is living, [facility], has reportedly not been allowing him to take his Wellbutrin.  He was evaluated, medically cleared, and a safety plan was placed after being evaluated by [facility].  He reports that he \"lied to them\" and that he was actually still suicidal last night but was \"feeling better\" so he decided that he would be okay to go home.  He reports that he was unable to get his prescription filled and now feels worse.  Denies any other specific plan and does not report that he would try to kill himself by cutting that he was \"just trying to release some of my frustration\".    Family history, surgical history, social history, current medications and allergies are reviewed with the patient and triage documentation and vitals are reviewed.      History provided by:  Patient and medical records   used: No        Review of Systems   Constitutional: Negative for chills and fever.   HENT: Negative for congestion and sore throat.    Eyes: Negative for photophobia and visual disturbance.   Respiratory: Negative for cough, shortness of breath and wheezing.    Cardiovascular: Negative for chest pain, palpitations and leg swelling.   Gastrointestinal: Negative for abdominal pain, constipation, diarrhea, nausea and vomiting.   Endocrine: Negative for polydipsia, polyphagia and polyuria.   Genitourinary: Negative for dysuria, frequency and urgency.   Musculoskeletal: Negative for arthralgias, back pain, myalgias and neck pain.   Skin: " Positive for wound. Negative for color change and rash.   Allergic/Immunologic: Negative.    Neurological: Negative for weakness, light-headedness, numbness and headaches.   Hematological: Negative.    Psychiatric/Behavioral: Positive for dysphoric mood, self-injury and suicidal ideas. Negative for hallucinations and sleep disturbance. The patient is not nervous/anxious.        Past Medical History:   Diagnosis Date   • Anxiety    • Depression        Allergies   Allergen Reactions   • Zoloft [Sertraline Hcl] Other (See Comments)             History reviewed. No pertinent surgical history.    History reviewed. No pertinent family history.    Social History     Socioeconomic History   • Marital status: Single     Spouse name: Not on file   • Number of children: Not on file   • Years of education: Not on file   • Highest education level: Not on file   Tobacco Use   • Smoking status: Never Smoker   Substance and Sexual Activity   • Alcohol use: Not Currently   • Drug use: Not Currently           Objective   Physical Exam  Vitals signs and nursing note reviewed.   Constitutional:       General: He is not in acute distress.     Appearance: Normal appearance. He is not ill-appearing, toxic-appearing or diaphoretic.   HENT:      Head: Normocephalic and atraumatic.   Eyes:      Extraocular Movements: Extraocular movements intact.      Conjunctiva/sclera: Conjunctivae normal.      Pupils: Pupils are equal, round, and reactive to light.   Neck:      Musculoskeletal: Normal range of motion and neck supple.   Cardiovascular:      Rate and Rhythm: Normal rate and regular rhythm.      Pulses: Normal pulses.      Heart sounds: No murmur.   Pulmonary:      Effort: Pulmonary effort is normal. No respiratory distress.      Breath sounds: Normal breath sounds. No wheezing.   Abdominal:      General: Bowel sounds are normal.      Palpations: Abdomen is soft.      Tenderness: There is no abdominal tenderness.   Musculoskeletal: Normal  range of motion.   Skin:     General: Skin is warm and dry.      Capillary Refill: Capillary refill takes less than 2 seconds.   Neurological:      General: No focal deficit present.      Mental Status: He is alert and oriented to person, place, and time.   Psychiatric:         Attention and Perception: He does not perceive auditory or visual hallucinations.         Mood and Affect: Mood is depressed. Affect is flat.         Speech: Speech normal.         Behavior: Behavior normal. Behavior is cooperative.         Thought Content: Thought content is not paranoid or delusional. Thought content includes suicidal ideation. Thought content does not include homicidal ideation. Thought content does not include homicidal or suicidal plan.         Cognition and Memory: Cognition and memory normal.         Procedures  none         ED Course      Labs Reviewed   COMPREHENSIVE METABOLIC PANEL - Abnormal; Notable for the following components:       Result Value    Creatinine 0.72 (*)     All other components within normal limits    Narrative:     GFR Normal >60  Chronic Kidney Disease <60  Kidney Failure <15     URINALYSIS W/ MICROSCOPIC IF INDICATED (NO CULTURE) - Abnormal; Notable for the following components:    Specific Gravity, UA 1.002 (*)     All other components within normal limits    Narrative:     Urine microscopic not indicated.   CBC WITH AUTO DIFFERENTIAL - Abnormal; Notable for the following components:    RDW 11.8 (*)     All other components within normal limits   RESPIRATORY PANEL PCR W/ COVID-19 (SARS-COV-2) BARBARA/FRANCISCA/FLORA/PAD/[facility] IN-HOUSE, NP SWAB IN UTM/VTP, 3-4 HR TAT - Normal    Narrative:     Fact sheet for providers: https://docs.[employer]/wp-content/uploads/APL6684-5362-MV4.1-EUA-Provider-Fact-Sheet-3.pdf    Fact sheet for patients: https://docs.[employer]/wp-content/uploads/TXO6057-0290-WN4.1-EUA-Patient-Fact-Sheet-1.pdf   URINE DRUG SCREEN - Normal    Narrative:     Cutoff For Drugs  Screened:    Amphetamines               500 ng/ml  Barbiturates               200 ng/ml  Benzodiazepines            150 ng/ml  Cocaine                    150 ng/ml  Methadone                  200 ng/ml  Opiates                    100 ng/ml  Phencyclidine               25 ng/ml  THC                            50 ng/ml  Methamphetamine            500 ng/ml  Tricyclic Antidepressants  300 ng/ml  Oxycodone                  100 ng/ml  Propoxyphene               300 ng/ml  Buprenorphine               10 ng/ml    The normal value for all drugs tested is negative. This report includes unconfirmed screening results, with the cutoff values listed, to be used for medical treatment purposes only.  Unconfirmed results must not be used for non-medical purposes such as employment or legal testing.  Clinical consideration should be applied to any drug of abuse test, particularly when unconfirmed results are used.     ACETAMINOPHEN LEVEL - Normal   SALICYLATE LEVEL - Normal   ETHANOL   CBC AND DIFFERENTIAL    Narrative:     The following orders were created for panel order CBC & Differential.  Procedure                               Abnormality         Status                     ---------                               -----------         ------                     CBC Auto Differential[[id number]]        Abnormal            Final result                 Please view results for these tests on the individual orders.   EXTRA TUBES    Narrative:     The following orders were created for panel order Extra Tubes.  Procedure                               Abnormality         Status                     ---------                               -----------         ------                     Light Blue Top[[id number]]                                   Final result               [facility][[id number]]                                   Final result                 Please view results for these tests on the individual orders.   LIGHT BLUE TOP    GOLD TOP - SST     Xr Ankle 3+ View Left    Result Date: 11/8/2020  Narrative: PROCEDURE: XR ANKLE 3+ VW LEFT VIEWS: 3 INDICATION: Injury, twisting injury COMPARISON: None FINDINGS:   - fracture: None   - alignment: Within normal limits   - misc: Soft tissue swelling overlies the lateral malleolus. There may be a very small joint effusion     Impression: Soft tissue swelling without acute osseous abnormality identified. Note:  if pain or symptoms persist beyond reasonable expectations and follow-up imaging is anticipated,  cross sectional imaging  (CT and/or MRI) is suggested, as is deemed clinically appropriate. Electronically signed by:  Melissa Barnes MD  11/8/2020 3:16 PM CST Workstation: [id number]    Xr Foot 3+ View Left    Result Date: 11/8/2020  Narrative: PROCEDURE: XR FOOT 3+ VW LEFT VIEWS: 3 INDICATION: Pain COMPARISON: None FINDINGS:   - fracture: None   - alignment: Within normal limits   - misc: Soft tissue swelling overlies the lateral malleolus. There may be a small joint effusion at the tibiotalar joint     Impression: Soft tissue swelling without acute osseous abnormality identified. Note:  if pain or symptoms persist beyond reasonable expectations and follow-up imaging is anticipated,  cross sectional imaging  (CT and/or MRI) is suggested, as is deemed clinically appropriate. Electronically signed by:  Melissa Barnes MD  11/8/2020 3:18 PM CST Workstation: [id number]          [facility] [place]  Number of Diagnoses or Management Options     Amount and/or Complexity of Data Reviewed  Clinical lab tests: reviewed  Discuss the patient with other providers: yes    Patient Progress  Patient progress: stable    Medically cleared and evaluated by behavioral health and felt appropriate for admission.    Final diagnoses:   Depression, unspecified depression type   Suicidal ideation            Octaviano Cherry, DO  11/11/20 2054

## 2020-11-12 NOTE — CONSULTS
[facility] Medicine Consult  Inpatient Hospitalist Consult  Consult performed by: Oscar Clements APRN  Consult ordered by: Og Treadwell MD          Date of Admission: 11/11/2020  Date of Consult: 11/12/20    Primary Care Physician: Sugey White APRN  Referring Physician:  Og Treadwell MD      Chief Complaint/Reason for Consultation: Medical co-management, Suicidal ideation    HPI:  This is a 20 year old male with a history of depression and methamphetamine use who is admitted to [facility] for suicidal ideations.  He reports last using methamphetamine over the summer.  No new complaints.     Past Medical History:   Past Medical History:   Diagnosis Date   • Anxiety    • Depression        Past Surgical History: Denies surgical history.    Family History: Denies family history of cancer.    Social History:   Social History     Socioeconomic History   • Marital status: Single     Spouse name: Not on file   • Number of children: Not on file   • Years of education: Not on file   • Highest education level: Not on file   Tobacco Use   • Smoking status: Never Smoker   Substance and Sexual Activity   • Alcohol use: Not Currently   • Drug use: Not Currently       Allergies:   Allergies   Allergen Reactions   • Zoloft [Sertraline Hcl] Rash             Medications:   Current Facility-Administered Medications:   •  acetaminophen (TYLENOL) tablet 650 mg, 650 mg, Oral, Q4H PRN, Og Treadwell MD  •  aluminum-magnesium hydroxide-simethicone (MAALOX MAX) 400-400-40 MG/5ML suspension 15 mL, 15 mL, Oral, Q6H PRN, Og Treadwell MD  •  [COMPLETED] buPROPion SR (WELLBUTRIN SR) 12 hr tablet 100 mg, 100 mg, Oral, Daily, 100 mg at 11/12/20 1226 **FOLLOWED BY** [START ON 11/13/2020] buPROPion XL (WELLBUTRIN XL) 24 hr tablet 150 mg, 150 mg, Oral, Daily, Giacomo Watts II, MD  •  cloNIDine (CATAPRES) tablet 0.1 mg, 0.1 mg, Oral, Q4H PRN, Og Treadwell  MD  •  hydrOXYzine pamoate (VISTARIL) capsule 50 mg, 50 mg, Oral, Q6H PRN, Og Treadwell MD  •  loperamide (IMODIUM) capsule 2 mg, 2 mg, Oral, Q2H PRN, Og Treadwell MD  •  magnesium hydroxide (MILK OF MAGNESIA) suspension 2400 mg/10mL 10 mL, 10 mL, Oral, Daily PRN, Og Treadwell MD  •  melatonin tablet 1.5 mg, 1.5 mg, Oral, Daily PRN, Giacomo Watts II, MD  •  mirtazapine (REMERON) tablet 15 mg, 15 mg, Oral, Nightly, Giacomo Watts II, MD  •  nicotine polacrilex (NICORETTE) gum 2 mg, 2 mg, Mouth/Throat, Q2H PRN, Giacomo Watts II, MD, 2 mg at 11/12/20 1238    Review of Systems:  Review of Systems   Constitutional: Negative for appetite change, chills, fatigue and fever.   HENT: Negative for congestion.    Eyes: Negative for visual disturbance.   Respiratory: Negative for cough, chest tightness, shortness of breath and wheezing.    Cardiovascular: Negative for chest pain, palpitations and leg swelling.   Gastrointestinal: Negative for abdominal distention, abdominal pain, diarrhea, nausea and vomiting.   Genitourinary: Negative for difficulty urinating and dysuria.   Musculoskeletal: Negative for arthralgias, back pain, myalgias and neck pain.   Skin: Negative for rash and wound.   Neurological: Negative for dizziness, syncope, weakness, light-headedness, numbness and headaches.   All other systems reviewed and are negative.     Otherwise complete ROS is negative except as mentioned above.    Physical Exam:   Temp:  [97.2 °F (36.2 °C)-97.8 °F (36.6 °C)] 97.8 °F (36.6 °C)  Heart Rate:  [60-70] 64  Resp:  [18] 18  BP: (126-131)/(59-74) 129/59  Physical Exam  Vitals signs reviewed.   Constitutional:       General: He is not in acute distress.     Appearance: He is well-developed. He is not diaphoretic.   HENT:      Head: Normocephalic and atraumatic.   Eyes:      Conjunctiva/sclera: Conjunctivae normal.   Neck:      Musculoskeletal: Normal range of motion and neck supple.    Cardiovascular:      Rate and Rhythm: Normal rate and regular rhythm.      Heart sounds: No murmur. No friction rub. No gallop.    Pulmonary:      Effort: Pulmonary effort is normal. No respiratory distress.      Breath sounds: Normal breath sounds. No wheezing or rales.   Chest:      Chest wall: No tenderness.   Abdominal:      General: Bowel sounds are normal. There is no distension.      Palpations: Abdomen is soft.      Tenderness: There is no abdominal tenderness.   Musculoskeletal: Normal range of motion.         General: No deformity.   Skin:     General: Skin is warm and dry.      Findings: No erythema.   Neurological:      General: No focal deficit present.      Mental Status: He is alert and oriented to person, place, and time. Mental status is at baseline.      Cranial Nerves: Cranial nerves are intact.      Sensory: Sensation is intact.      Motor: Motor function is intact.      Coordination: Coordination is intact.      Gait: Gait is intact.      Comments: CN I: Sense of smell intact  CN II: Visual fields intact  CN III,IV,VI: extraocular movements intact  CN V: Masseter strength and sensation in all three divisions intact  CN VII: Smile and eyelid closure symmetrical  CN VIII: Hearing intact  CN IX and X: Voice and palate movement intact  CN XI: Shoulder shrug intact  CN XII: Tongue protrusion and movement intact             Results Reviewed:  I have personally reviewed current lab, radiology, and data and agree with results.  Lab Results (last 24 hours)     Procedure Component Value Units Date/Time    Extra Tubes [[id number]] Collected: 11/12/20 0647    Specimen: Blood, Venous Line Updated: 11/12/20 0801    Narrative:      The following orders were created for panel order Extra Tubes.  Procedure                               Abnormality         Status                     ---------                               -----------         ------                     Lavender Top[[id number]]                                      Final result               Gold Top - SST[[id number]]                                   Final result                 Please view results for these tests on the individual orders.    Lavender Top [[id number]] Collected: 11/12/20 0647    Specimen: Blood Updated: 11/12/20 0801     Extra Tube hold for add-on     Comment: Auto resulted       Gold Top - SST [[id number]] Collected: 11/12/20 0647    Specimen: Blood Updated: 11/12/20 0801     Extra Tube Hold for add-ons.     Comment: Auto resulted.       Glucose, Fasting [[id number]]  (Abnormal) Collected: 11/12/20 0647    Specimen: Blood Updated: 11/12/20 0742     Glucose, Fasting 107 mg/dL     Lipid Panel [[id number]] Collected: 11/12/20 0647    Specimen: Blood Updated: 11/12/20 0742     Total Cholesterol 137 mg/dL      Triglycerides 58 mg/dL      HDL Cholesterol 44 mg/dL      LDL Cholesterol  81 mg/dL      VLDL Cholesterol 12 mg/dL      LDL/HDL Ratio 1.85    Narrative:      Cholesterol Reference Ranges  (U.S. Department of Health and Human Services ATP III Classifications)    Desirable          <200 mg/dL  Borderline High    200-239 mg/dL  High Risk          >240 mg/dL      Triglyceride Reference Ranges  (U.S. Department of Health and Human Services ATP III Classifications)    Normal           <150 mg/dL  Borderline High  150-199 mg/dL  High             200-499 mg/dL  Very High        >500 mg/dL    HDL Reference Ranges  (U.S. Department of Health and Human Services ATP III Classifcations)    Low     <40 mg/dl (major risk factor for CHD)  High    >60 mg/dl ('negative' risk factor for CHD)        LDL Reference Ranges  (U.S. Department of Health and Human Services ATP III Classifcations)    Optimal          <100 mg/dL  Near Optimal     100-129 mg/dL  Borderline High  130-159 mg/dL  High             160-189 mg/dL  Very High        >189 mg/dL        Imaging Results (Last 24 Hours)     ** No results found for the last 24 hours. **          Assessment:    Suicide  ideation            Recommendations:  1. Psychiatric management per primary team  2. No acute medical complaints    Will sign off.  Please do not hesitate to call with questions or changes in the patients condition. Thank you.      I discussed the patients findings and my recommendations with: Dr. Mac Clements, APRN  11/12/20  16:14 CST

## 2020-11-12 NOTE — PLAN OF CARE
Goal Outcome Evaluation:  Plan of Care Reviewed With: patient  Progress: improving  Outcome Summary: pt states still feels hopeless but feels safe here.  STates wishes could go to sleep and not wake up.

## 2020-11-12 NOTE — NURSING NOTE
Inpatient Psychiatry Initial Intake    11/11/2020    Juan Carlos Nagel, a 20 y.o. male, for initial evaluation visit.  Patient is referred by   Patient information was obtained from patient.  Patient presented voluntarily to the Emergency Department.  Precipitant and chief complaint: According to He,  Patient has had suicidial thoughts and tried cutting wrist with dull knife. .  Patient presents with depression, suicidal thoughts/threats and suicide attempt Cut locations: wrist(s) bilateral.   Onset of symptoms was 2 weeks ago  Patient states symptoms have been exacerbated by legal problems and being off medicine for 3 weeks.      Current Medications:  Scheduled Meds: wellbutrin  PRN Meds: tylenol    History:     Past Psychiatric History:   Previous therapy: yes  Previous psychiatric treatment and medication trials:  yes - patient has been in counseling and was taking wellbutrin  Previous psychiatric hospitalizations:  yes - [facility] in [facility]  Previous diagnoses:     yes - anxiety and depression  Previous suicide attempts:    yes - at 14 tried to cut wrists  Previous homicide attempts:    no  Family history of suicide:    yes - mother,father, and grandmother  Previous history of abuse:     no         Further details: mom recently passed away        No  History of legal issues and violence: The patient has had legal significant legal charges for recieving [employer] property court date is 11/13/2020.  [facility].  Education: 11th grade  Other pertinent history: Legal    Current Evaluation:     Mental Status Evaluation:  Appearance:  age appropriate   Behavior:  normal   Speech:  normal pitch and normal volume   Mood:  depressed   Affect:  flat   Thought Process:  normal   Thought Content:  suicidal   Sensorium:  person, place, time/date, situation, day of week, month of year and year   Cognition:  grossly intact   Insight:  impaired due to SI   Judgment:  impaired due to SI          Psychiatric Review Of Systems:  Sleep: no  Appetite changes: no  Weight changes: no  Energy: yes, been feeling down  Interest/pleasure/anhedonia: no  Libido: no  Sexual orientation:  heterosexual  Anxiety/panic: yes  Guilty/hopeless: yes  Self-injurious behavior/risky behavior: no    Stressors: legal feel like im messing up     Substance Abuse History:     Substance Abuse History:  Tobacco use: yes - cigarettes  Use of alcohol: yes - 1 week ago  Recreational drugs: marijuana and methamphetamines-4 months ago  Use of OTC medications: no  Hx of Overdose:  no  Hx of Blackouts: no  Past attempts to quit or limit use?:no  Patient feels he has mental, emotional, or medical problems co-occurred or worsened as a result of alcohol and/or drug use: yes - mental health worsened    Suicide Risk Screening:     Suicidal Ideation:  Current thoughts of suicide?no  Recent thoughts of suicide?yes - earlier today    Suicide Planning:  Specific Plan?yes - tried to cut wrist with dull knife  Thought Content/Patients own words:I am not having suicidal thoughts at the current time but I did have them earlier today. .  Potentially lethal means?yes - cooking knives  Access to gun?no  Access to other lethal means?no    Suicide Attempt:  Recent attempt?yes - cutting wrists  Past attempt?yes - yes  Cutting/burning/self-mutilation?yes - cutting wrists      Protective Factors:  none    Homicide Risk Screening:     Homicidal Ideation:  Current thoughts of homicide:  no  Recent thoughts of homicide:  no    Homicidal Planning:  Specific Plan?  no  Thought Content/Patients own words:none  Access/Means?  no    Perceptual Disturbances     Auditory:  nonone  Hallucinations continued:  none    Hypnopompic hallucinations? no Patients own words: n/a  Hypnagogic hallucinations?  no  Patients own words: n/a    Delusions? no n/a  Patients own words: none.    Shared Delusion no        Recommendations:     Reviewed with: Dr Treadwell  Medically cleared  by: Dr laboy  Admit to Inpatient Behavioral Health Unit: yes - for SI  If admitted to unit please perform Review of Current Symptoms for Dr. Kaur.      ( .tato ) note    Elen Benz RN

## 2020-11-12 NOTE — ED NOTES
Norberto Lennon sitting with patient. Patient has no bandages at this time. Patients head is exposed at this time.      Sheree Heart  11/11/20 0688

## 2020-11-12 NOTE — NURSING NOTE
Behavior   Note any precipitants to event or behavior   Describe level and action of any aggressive behavior or speech and associated interventions.     Anxiety: Excess Worry  Depression: depressed mood, feelings of worthlessness/guilt, hopelessness and suicidal thoughts  Pain  2  AVH   no  S/I   no  Plan  yes   H/I   no  Plan  no    Affect   mood-congruent      Note:  Pt interacts with peers and talks to staff, pt does not seem withdrawn, but when asked pt voices he does not feel he has any choices.  Pt states he wishes he could go to sleep and not wake up.       Intervention    PRN medication utilized:  no    Instructed in medication usage and effects  Medications administered as ordered  Encouraged to verbalize needs      Response    Verbalized understanding   Did patient take medications as ordered yes   Did patient interact with assessment?  yes     Plan    Will monitor for safety  Will monitor every 15 minutes as ordered  Will evaluate and promote the plan of care    Last BM:  unknown date  (Please chart in I/O as well)

## 2020-11-12 NOTE — NURSING NOTE
"Patient arrived to unit via w/c from ER to U room 659 at 2119 for dx of SI. He was wanded at the door for contraband. He was accompanied by security and U staff to room for initial admission process. Patient has been calm and cooperative. He c/o 2/10 pain to left ankle for recent sprain. He denies AVH, HI and SI at this time but does state \"I have suicidal thoughts quite a bit honestly\" when asked about lifetime history of thoughts. When asked his reason for admission, pt states \"I just want to go to sleep and not wake up. I'm tired of living. I want to get better, but the place I'm at is not for me.\" He came from [facility]. He stated that they would not let him take his Wellbutrin, so he hasn't had it for 3 weeks. He tried to cut his wrist with a dull knife. He also has a history of trying to slit his wrist back when he was 14 years old. He stated a history of trauma from childhood because his parents weren't together and they was always strung out. He also lost his mother recently and said he had to watch her die of cancer and that was also traumatic for him.   Explained unit routine. Initiate care plans. Monitor as ordered to maintain patient safety.       "

## 2020-11-12 NOTE — NURSING NOTE
Spoke with pt's sister on phone who provided code. Sister voices concern about pt going back to [facility] and them taking him off his meds again.  Sister inquired as to doctor's choice of medications and told sister this RN could not speak for 's rationale of medication choice and that she would need to speak with physician directly, so after conversation transferred sister to  Dr Watts's voice mail.

## 2020-11-13 LAB
25(OH)D3 SERPL-MCNC: 25.8 NG/ML (ref 30–100)
FOLATE SERPL-MCNC: 15.4 NG/ML (ref 4.78–24.2)
TSH SERPL DL<=0.05 MIU/L-ACNC: 2.66 UIU/ML (ref 0.27–4.2)
VIT B12 BLD-MCNC: 561 PG/ML (ref 211–946)

## 2020-11-13 PROCEDURE — 84443 ASSAY THYROID STIM HORMONE: CPT | Performed by: PSYCHIATRY & NEUROLOGY

## 2020-11-13 PROCEDURE — 82306 VITAMIN D 25 HYDROXY: CPT | Performed by: PSYCHIATRY & NEUROLOGY

## 2020-11-13 PROCEDURE — 99232 SBSQ HOSP IP/OBS MODERATE 35: CPT | Performed by: PSYCHIATRY & NEUROLOGY

## 2020-11-13 PROCEDURE — 82746 ASSAY OF FOLIC ACID SERUM: CPT | Performed by: PSYCHIATRY & NEUROLOGY

## 2020-11-13 PROCEDURE — 82607 VITAMIN B-12: CPT | Performed by: PSYCHIATRY & NEUROLOGY

## 2020-11-13 RX ADMIN — NICOTINE POLACRILEX 2 MG: 2 GUM, CHEWING BUCCAL at 11:18

## 2020-11-13 RX ADMIN — BUPROPION HYDROCHLORIDE 150 MG: 150 TABLET, FILM COATED, EXTENDED RELEASE ORAL at 08:08

## 2020-11-13 RX ADMIN — NICOTINE POLACRILEX 2 MG: 2 GUM, CHEWING BUCCAL at 17:03

## 2020-11-13 RX ADMIN — MELATONIN 1.5 MG: at 20:52

## 2020-11-13 RX ADMIN — MIRTAZAPINE 15 MG: 15 TABLET, FILM COATED ORAL at 20:52

## 2020-11-13 RX ADMIN — HYDROXYZINE PAMOATE 50 MG: 50 CAPSULE ORAL at 20:52

## 2020-11-13 NOTE — PROGRESS NOTES
LCSW spoke with Colt at [facility] who stated they are unable to take pt back due to medication that he is on. Spoke with pt re: Kevin Vale, he will call today to begin referral process.

## 2020-11-13 NOTE — NURSING NOTE
"Behavior   Note any precipitants to event or behavior   Describe level and action of any aggressive behavior or speech and associated interventions.     Anxiety: Patient denies at this time  Depression: depressed mood  Pain  3  AVH   no  S/I   no  Plan  no  H/I   no  Plan  no    Affect   euthymic/normal      Note: Pt in common area during interview. Pt denies SI,\"none so far this morning.\" Continues to endorse depression. Took medication as ordered.       Intervention    PRN medication utilized:  no    Instructed in medication usage and effects  Medications administered as ordered  Encouraged to verbalize needs      Response    Verbalized understanding   Did patient take medications as ordered yes   Did patient interact with assessment?  yes     Plan    Will monitor for safety  Will monitor every 15 minutes as ordered  Will evaluate and promote the plan of care    Last BM:  unknown date  (Please chart in I/O as well)    "

## 2020-11-13 NOTE — PLAN OF CARE
Goal Outcome Evaluation:  Plan of Care Reviewed With: patient  Progress: improving  Outcome Summary: Pt denies SI/HI/AVH at this time. Has been attending groups and observed interacting with peers and staff.

## 2020-11-13 NOTE — NURSING NOTE
"Behavior   Note any precipitants to event or behavior   Describe level and action of any aggressive behavior or speech and associated interventions.     Anxiety: Excess Worry  Depression: depressed mood, feelings of worthlessness/guilt, hopelessness and suicidal thoughts  Pain  2  AVH   no  S/I   yes   Plan  no  H/I   no  Plan  no    Affect   mood-congruent      Note:  Pt interacts with peers and talks to staff, pt states \" I was really depressed this morning but as the day went on I feel a little better.\"  Pt states he is still anxious and requested prn vistaril with some relief reported.   Intervention    PRN medication utilized:  yes - melatonin, vistaril    Instructed in medication usage and effects  Medications administered as ordered  Encouraged to verbalize needs      Response    Verbalized understanding   Did patient take medications as ordered yes   Did patient interact with assessment?  yes     Plan    Will monitor for safety  Will monitor every 15 minutes as ordered  Will evaluate and promote the plan of care    Last BM:  unknown date  (Please chart in I/O as well)    "

## 2020-11-13 NOTE — PLAN OF CARE
Goal Outcome Evaluation:  Plan of Care Reviewed With: patient  Progress: improving  Outcome Summary: pt has c/o being depressed in AM 11/12 but he states it has been better this afternoon. pt has had approx. 6 hr of sleep with few interuptions. VSS. will continue to monitor and provide safe environment.

## 2020-11-13 NOTE — PROGRESS NOTES
11/13/2020    Chief Complaint: suicidal ideation and depression    Subjective:  Patient is a 20 y.o. male that is currently inpatient on the adult U today he is seen in the [facility] area and treatment team. Pt reports that he has some foot/ankle pain in the right foot, but that he sprained it a few days ago, no edema noticed or bruising.   Pt states that he does not wish to attend [facility], he states that it is not a good rehab for him to attend, he is willing to go to rehab, but rather it be another. He states they will not allow him to have his medications while there.   Pt states that he is anxious about finding another facility.  He is court ordered to go to a treatment facility.  Pt states that his sister is trying to help him.   Pt has been engaged with peers and is attending groups, he states that he is not suicidal at this time, he states he is afraid of how he will feel if he goes back to .   Pt is compliant with medications, he is reporting that he did sleep well.   No AE with medications.   Objective     Vital Signs    Temp:  [97 °F (36.1 °C)] 97 °F (36.1 °C)  Heart Rate:  [60] 60  Resp:  [18] 18  BP: (126)/(58) 126/58    Physical Exam:   General Appearance: alert, appears stated age and cooperative,  Hygiene:   fair  Gait & Station: Normal  Musculoskeletal: No tremors or abnormal involuntary movements    Mental Status Exam:   Cooperation:  Cooperative  Eye Contact:  Fair  Psychomotor Behavior:  Appropriate  Mood: Improving  Affect:  normal  Speech:  Normal  Thought Process:  Coherent  Associations: Goal Directed  Thought Content:     Mood congruent   Suicidal:  None   Homicidal:  None   Hallucinations:  None   Delusion:  None  Cognitive Functioning:   Consciousness: awake, alert and oriented  Reliability:  fair  Insight:  Fair  Judgement:  Fair  Impulse Control:  Fair    Lab Results (last 24 hours)     Procedure Component Value Units Date/Time    TSH [[id number]]  (Normal) Collected: 11/13/20 0683     Specimen: Blood Updated: 11/13/20 0802     TSH 2.660 uIU/mL     Vitamin B12 [[id number]] Collected: 11/13/20 0654    Specimen: Blood Updated: 11/13/20 0730    Folate [[id number]] Collected: 11/13/20 0654    Specimen: Blood Updated: 11/13/20 0730    Vitamin D 25 Hydroxy [[id number]] Collected: 11/13/20 0654    Specimen: Blood Updated: 11/13/20 0730        Imaging Results (Last 24 Hours)     ** No results found for the last 24 hours. **          Medicine:   Current Facility-Administered Medications:   •  acetaminophen (TYLENOL) tablet 650 mg, 650 mg, Oral, Q4H PRN, Og Treadwell MD  •  aluminum-magnesium hydroxide-simethicone (MAALOX MAX) 400-400-40 MG/5ML suspension 15 mL, 15 mL, Oral, Q6H PRN, Og Treadwell MD  •  [COMPLETED] buPROPion SR (WELLBUTRIN SR) 12 hr tablet 100 mg, 100 mg, Oral, Daily, 100 mg at 11/12/20 1226 **FOLLOWED BY** buPROPion XL (WELLBUTRIN XL) 24 hr tablet 150 mg, 150 mg, Oral, Daily, Giacomo Watts II, MD, 150 mg at 11/13/20 0808  •  cloNIDine (CATAPRES) tablet 0.1 mg, 0.1 mg, Oral, Q4H PRN, Og Treadwell MD  •  hydrOXYzine pamoate (VISTARIL) capsule 50 mg, 50 mg, Oral, Q6H PRN, Og Treadwell MD, 50 mg at 11/12/20 2041  •  loperamide (IMODIUM) capsule 2 mg, 2 mg, Oral, Q2H PRN, Og Treadwell MD  •  magnesium hydroxide (MILK OF MAGNESIA) suspension 2400 mg/10mL 10 mL, 10 mL, Oral, Daily PRN, Og Treadwell MD  •  melatonin tablet 1.5 mg, 1.5 mg, Oral, Daily PRN, Giacomo Watts II, MD, 1.5 mg at 11/12/20 2042  •  mirtazapine (REMERON) tablet 15 mg, 15 mg, Oral, Nightly, Giacomo Watts II, MD, 15 mg at 11/12/20 2042  •  nicotine polacrilex (NICORETTE) gum 2 mg, 2 mg, Mouth/Throat, Q2H PRN, Giacomo Watts II, MD, 2 mg at 11/12/20 2042    Diagnoses/Assessment:     Suicide ideation    Acute depression    MDD (major depressive disorder), recurrent severe, without psychosis (CMS/HCC)    Stimulant use disorder, in recent remission, in a  controlled environ    Rule Out Substance (Stimulant) induced mood disorder      Treatment Plan:    1) Will continue care for the patient on the behavioral health unit at [facility] to ensure patient safety.  2) Will continue to provide treatment with the unit milieu, activities, therapies and psychopharmacological management.  3) Patient to be placed on or continued on  Q15 minute checks  and Suicide precautions.  4) Pertinent medical issues: none  5) Will order following labs: none  6) Will make the following medication changes:   --Cont Wellbutrin 150 mg daily  --Cont Remeron 15 mg QHS  7) Will continue discharge planning as appropriate for patient.  8) Psychotherapy provided for less than 16 minutes.    Treatment plan and medication risks and benefits discussed with: Patient    Kitty Grayson, APRN  11/13/20  10:56 CST

## 2020-11-14 PROCEDURE — 99232 SBSQ HOSP IP/OBS MODERATE 35: CPT | Performed by: PSYCHIATRY & NEUROLOGY

## 2020-11-14 PROCEDURE — 90833 PSYTX W PT W E/M 30 MIN: CPT | Performed by: PSYCHIATRY & NEUROLOGY

## 2020-11-14 RX ADMIN — NICOTINE POLACRILEX 2 MG: 2 GUM, CHEWING BUCCAL at 17:56

## 2020-11-14 RX ADMIN — MELATONIN 1.5 MG: at 20:24

## 2020-11-14 RX ADMIN — BUPROPION HYDROCHLORIDE 150 MG: 150 TABLET, FILM COATED, EXTENDED RELEASE ORAL at 08:26

## 2020-11-14 RX ADMIN — MIRTAZAPINE 15 MG: 15 TABLET, FILM COATED ORAL at 20:24

## 2020-11-14 RX ADMIN — HYDROXYZINE PAMOATE 50 MG: 50 CAPSULE ORAL at 20:25

## 2020-11-14 RX ADMIN — NICOTINE POLACRILEX 2 MG: 2 GUM, CHEWING BUCCAL at 12:57

## 2020-11-14 NOTE — NURSING NOTE
Patients sister Nina Loza called about her brother and asking about his cloths and if she could be called in plenty of time prior to his discharge related to her living over 1.5 hours away to get him to his new facility on time. Assurance provided.

## 2020-11-14 NOTE — NURSING NOTE
Behavior   Note any precipitants to event or behavior   Describe level and action of any aggressive behavior or speech and associated interventions.     Anxiety: Restless/Edgy  Depression: Patient denies at this time  Pain  0  AVH   no  S/I   no  Plan  no  H/I   no  Plan  no    Affect   euthymic/normal      Note: Patient was out in dining area with peers during assessment. He denied SI/HI/AVH. He participated in group and had a snack. He said that he has had a good day today and is making friends. He took melatonin for sleep and vistaril for anxiety. Will continue to monitor.       Intervention    PRN medication utilized:  Melatonin and Vistaril    Instructed in medication usage and effects  Medications administered as ordered  Encouraged to verbalize needs      Response    Verbalized understanding   Did patient take medications as ordered yes   Did patient interact with assessment?  yes     Plan    Will monitor for safety  Will monitor every 15 minutes as ordered  Will evaluate and promote the plan of care    Last BM:  unknown date  (Please chart in I/O as well)

## 2020-11-14 NOTE — PLAN OF CARE
Goal Outcome Evaluation:  Plan of Care Reviewed With: patient  Progress: improving  Outcome Summary: patient has been resting in bed good tonight. He has slept 5 hours so far tonight. Melatonin and vistaril effective.

## 2020-11-14 NOTE — PLAN OF CARE
Goal Outcome Evaluation:  Plan of Care Reviewed With: patient  Progress: improving  Outcome Summary: has been medication compliant, interacting with staff and peers, nicotine gum given once thus far, will continue to monitor

## 2020-11-14 NOTE — NURSING NOTE
Behavior   Note any precipitants to event or behavior   Describe level and action of any aggressive behavior or speech and associated interventions.     Anxiety: Restless/Edgy  Depression: Patient denies at this time  Pain  0  AVH   no  S/I   no  Plan  no  H/I   no  Plan  no    Affect   euthymic/normal      Note: Patient was in own room during assessment. He denied SI/HI/AVH. Medication compliant, no complaints but is concerned about his cloths and making sure he gets them all Monday when he is discharged, assurance provided.      Intervention    PRN medication utilized:  no    Instructed in medication usage and effects  Medications administered as ordered  Encouraged to verbalize needs      Response    Verbalized understanding   Did patient take medications as ordered yes   Did patient interact with assessment?  yes     Plan    Will monitor for safety  Will monitor every 15 minutes as ordered  Will evaluate and promote the plan of care    Last BM:  unknown date  (Please chart in I/O as well)

## 2020-11-15 PROCEDURE — 99232 SBSQ HOSP IP/OBS MODERATE 35: CPT | Performed by: PSYCHIATRY & NEUROLOGY

## 2020-11-15 RX ORDER — MELATONIN
2000
Status: DISCONTINUED | OUTPATIENT
Start: 2020-11-15 | End: 2020-11-16 | Stop reason: HOSPADM

## 2020-11-15 RX ADMIN — HYDROXYZINE PAMOATE 50 MG: 50 CAPSULE ORAL at 20:18

## 2020-11-15 RX ADMIN — MELATONIN 1.5 MG: at 20:18

## 2020-11-15 RX ADMIN — Medication 2000 UNITS: at 17:23

## 2020-11-15 RX ADMIN — NICOTINE POLACRILEX 2 MG: 2 GUM, CHEWING BUCCAL at 14:57

## 2020-11-15 RX ADMIN — MIRTAZAPINE 15 MG: 15 TABLET, FILM COATED ORAL at 20:18

## 2020-11-15 RX ADMIN — NICOTINE POLACRILEX 2 MG: 2 GUM, CHEWING BUCCAL at 17:50

## 2020-11-15 RX ADMIN — NICOTINE POLACRILEX 2 MG: 2 GUM, CHEWING BUCCAL at 09:35

## 2020-11-15 RX ADMIN — BUPROPION HYDROCHLORIDE 150 MG: 150 TABLET, FILM COATED, EXTENDED RELEASE ORAL at 08:36

## 2020-11-15 NOTE — PLAN OF CARE
Goal Outcome Evaluation:  Plan of Care Reviewed With: patient  Progress: improving  Outcome Summary: Juan Carlos has been calm and cooperative with staff and peers this shift. He is medication compliant. Open communication encouraged.

## 2020-11-15 NOTE — NURSING NOTE
Behavior   Note any precipitants to event or behavior   Describe level and action of any aggressive behavior or speech and associated interventions.     Anxiety: Excess Worry  Depression: Patient denies at this time  Pain  0  AVH   no  S/I   no  Plan  no  H/I   no  Plan  no    Affect   euthymic/normal      Note: Patient interviewed in common area. Patient c/o anxiety. Patient received prn vistaril for anxiety. Patient received prn melatonin to promote sleep. Medications effective, patient resting in bed at this time. Denies SI/HI/AVH. No behaviors noted. Patient interacts with staff and peers appropriately.     Intervention    PRN medication utilized:  yes - vistaril and melatonin    Instructed in medication usage and effects  Medications administered as ordered  Encouraged to verbalize needs      Response    Verbalized understanding   Did patient take medications as ordered yes   Did patient interact with assessment?  yes     Plan    Will monitor for safety  Will monitor every 15 minutes as ordered  Will evaluate and promote the plan of care    Last BM:  unknown date  (Please chart in I/O as well)

## 2020-11-15 NOTE — PROGRESS NOTES
"Psychiatry Progress Note   11/14/2020      HD: #3  Legal: Vol    Chief Complaint: Suicidal Ideation and Severe Depression      Subjective --  Mr. Juan Carlos Nagel is a 20 y.o. male who was seen on the Adult unit.      Pt is pleasant.  Focused on d/c planning to [facility].      We talked to his sister, they will discuss d/c plan to go straight there vs home then to rehab.     Mood improving.  Denies SI.  Less intense, less severe mood sx.         Review of Systems:  --Neuro: Denies headache  --GI: Denies stomachache  --MS: Denies muscle ache  --General: Denies dizziness.        Objective   Objective --    Vital Signs:  Temp:  [97 °F (36.1 °C)-97.8 °F (36.6 °C)] 97.8 °F (36.6 °C)  Heart Rate:  [63-70] 63  Resp:  [18] 18  BP: (113-121)/(56-70) 121/70    Patient Vitals for the past 24 hrs:   BP Temp Temp src Pulse Resp SpO2   11/14/20 1900 121/70 97.8 °F (36.6 °C) Tympanic 63 18 98 %   11/14/20 0900 113/56 97 °F (36.1 °C) Tympanic 70 18 96 %          Physical Exam:   -General Appearance:  alert and in NAD  -Hygiene:  Adequate   -Gait & Station:  Blank multiple: Normal  -Musculoskeletal:  No tremors or abnormal involuntary movements and No atrophy noted  -Pulm: unlaboured     Mental Status Exam:   --Cooperation:  Cooperative  --Eye Contact:  Fair  --Psychomotor Behavior:  Appropriate  --Mood:  \"OK\" and Improving  --Affect:  mood-congruent and improving  --Speech:  Normal r/r/v  --Thought Process:  Coherent  --Associations: Goal Directed  --Themes:  None overt  --Thought Content:     --Mood congruent   --Suicidal:  Denies    --Homicidal:  Denies   --Hallucinations:  Denies and Not appearing to respond to internal stimuli at time of my interview   --Delusion:  None noted/overt  --Cognitive Functioning:  --Consciousness: awake and alert  Concentration:  Improving  --Reliability:  fair  --Insight:  Improving  --Judgment:  Improving  --Impulse Control:  Improving      Lab Results (last 24 hours)     ** No results found for the " last 24 hours. **          Imaging Results (Last 24 Hours)     ** No results found for the last 24 hours. **            Medications:   Scheduled Meds:buPROPion XL, 150 mg, Oral, Daily  mirtazapine, 15 mg, Oral, Nightly      Continuous Infusions:   PRN Meds:.•  acetaminophen  •  aluminum-magnesium hydroxide-simethicone  •  cloNIDine  •  hydrOXYzine pamoate  •  loperamide  •  magnesium hydroxide  •  melatonin  •  nicotine polacrilex      Assessment:     Suicide ideation    Acute depression    MDD (major depressive disorder), recurrent severe, without psychosis (CMS/HCC)    Stimulant use disorder, in recent remission, in a controlled environ    Rule Out Substance (Stimulant) induced mood disorder        Treatment Plan:  1) Will continue care for the patient on the behavioral health unit at [facility] to ensure patient safety.    2) Will continue to provide treatment with the unit milieu, activities, therapies and psychopharmacological management.    3) Patient to be placed on or continued on  Q15 minute checks  and Suicide precautions.    4) Pertinent Concurrent Non-Psychiatric Medical Issues:   --None significant or contributory.     5) Will order following labs: none    6) Behavioral Health Treatment Planning:  --Cont Remeron 15mg QHS for mood and sleep  --Cont Wellbutrin XL 150mg daily for affective sx  --Vit D3 @ 2k daily for supplementation    7) Psychotherapy provided: as below    --> Dispostion Planning: to rehab in next 2-3 days    Treatment plan and medication risks and benefits discussed with: Patient, Family and Treatment Team.    Giacomo Watts II, MD  11/14/20 @ 21:25 CST    Dictated using Lebronon.      Psychotherapy Note  --Total Psychotherapy Time: 20 minutes  --Participants: Patient, myself, pt's sister for a time  --Current Clinical Status: improving mood  --Focus of Therapeutic Encounter: insight, schema  --Intervention Type: Supportive, CBT/cognitive, Psycho-Educational and Insight  Focused  --Therapy notes: I provided reflective listening, supportive therapy, reflection, and allowed them to express affect in therapy course.  Cognitive behavioral therapy targeting schema burden, distortions - identifying and challenging these.  Educational re: dx, tx planning.  Insight to situation.   --DX: as above  --Plan: Continue to work on developing & strengthening coping skills; correcting maladaptive schema; work on insight  --Post therapy status: improving affect and insight

## 2020-11-15 NOTE — PLAN OF CARE
Goal Outcome Evaluation:  Plan of Care Reviewed With: patient  Progress: improving  Outcome Summary: Patient received prn vistaril for anxiety and prn melatonin to promote sleep. Medications effective, patient sleeping well ~7 hours. Denies SI/HI/AVH.

## 2020-11-15 NOTE — NURSING NOTE
Behavior   Note any precipitants to event or behavior   Describe level and action of any aggressive behavior or speech and associated interventions.     Anxiety: Patient denies at this time  Depression: Patient denies at this time  Pain  0  AVH   no  S/I   no  Plan  no  H/I   no  Plan  no    Affect   blunted      Note: Pt seen in his room sleeping this am. He awakens easily to verbal stimuli. He denies si, avh, hi. He is taking medications as ordered. Open communication encouraged.       Intervention    PRN medication utilized:  no    Instructed in medication usage and effects  Medications administered as ordered  Encouraged to verbalize needs      Response    Verbalized understanding   Did patient take medications as ordered yes   Did patient interact with assessment?  yes     Plan    Will monitor for safety  Will monitor every 15 minutes as ordered  Will evaluate and promote the plan of care    Last BM:  unknown date  (Please chart in I/O as well)

## 2020-11-16 VITALS
HEIGHT: 76 IN | BODY MASS INDEX: 24.96 KG/M2 | TEMPERATURE: 97.2 F | RESPIRATION RATE: 18 BRPM | HEART RATE: 63 BPM | SYSTOLIC BLOOD PRESSURE: 122 MMHG | OXYGEN SATURATION: 98 % | DIASTOLIC BLOOD PRESSURE: 58 MMHG | WEIGHT: 205 LBS

## 2020-11-16 PROBLEM — R45.851 SUICIDE IDEATION: Status: RESOLVED | Noted: 2020-11-11 | Resolved: 2020-11-16

## 2020-11-16 PROCEDURE — 99238 HOSP IP/OBS DSCHRG MGMT 30/<: CPT | Performed by: PSYCHIATRY & NEUROLOGY

## 2020-11-16 RX ORDER — BUPROPION HYDROCHLORIDE 150 MG/1
150 TABLET ORAL DAILY
Qty: 30 TABLET | Refills: 0 | Status: SHIPPED | OUTPATIENT
Start: 2020-11-17

## 2020-11-16 RX ORDER — MELATONIN
2000
Qty: 60 TABLET | Refills: 0 | Status: SHIPPED | OUTPATIENT
Start: 2020-11-16

## 2020-11-16 RX ORDER — MIRTAZAPINE 15 MG/1
15 TABLET, FILM COATED ORAL NIGHTLY
Qty: 30 TABLET | Refills: 0 | Status: SHIPPED | OUTPATIENT
Start: 2020-11-16

## 2020-11-16 RX ADMIN — BUPROPION HYDROCHLORIDE 150 MG: 150 TABLET, FILM COATED, EXTENDED RELEASE ORAL at 08:06

## 2020-11-16 RX ADMIN — NICOTINE POLACRILEX 2 MG: 2 GUM, CHEWING BUCCAL at 09:06

## 2020-11-16 NOTE — PROGRESS NOTES
"Psychiatry Progress Note   11/15/2020      HD: #4  Legal: Vol    Chief Complaint: Suicidal Ideation and Severe Depression      Subjective --  Mr. Juan Carlos Nagel is a 20 y.o. male who was seen on the Adult unit.      Pt is pleasant.  Remains focused on d/c planning to rehab.  Wanting to d/c to go directly to rehab.  Sister to  tomorrow.    Mood improving.  Denies SI.  Less intense, less severe mood sx.         Review of Systems:  --Neuro: Denies headache  --GI: Denies stomachache  --MS: Denies muscle ache  --General: Denies dizziness.        Objective   Objective --    Vital Signs:  Temp:  [97.4 °F (36.3 °C)-98.1 °F (36.7 °C)] 98.1 °F (36.7 °C)  Heart Rate:  [60-83] 83  Resp:  [18] 18  BP: (106-130)/(59-64) 106/64    Patient Vitals for the past 24 hrs:   BP Temp Temp src Pulse Resp SpO2   11/15/20 1900 106/64 98.1 °F (36.7 °C) Tympanic 83 18 97 %   11/15/20 0700 130/59 97.4 °F (36.3 °C) Tympanic 60 18 98 %          Physical Exam:   -General Appearance:  alert and in NAD  -Hygiene:  Adequate   -Gait & Station:  Blank multiple: Normal  -Musculoskeletal:  No tremors or abnormal involuntary movements and No atrophy noted  -Pulm: unlaboured     Mental Status Exam:   --Cooperation:  Cooperative  --Eye Contact:  Fair  --Psychomotor Behavior:  Appropriate  --Mood:  \"OK\" and Improving  --Affect:  mood-congruent and improving  --Speech:  Normal r/r/v  --Thought Process:  Coherent  --Associations: Goal Directed  --Themes:  None overt  --Thought Content:     --Mood congruent   --Suicidal:  Denies    --Homicidal:  Denies   --Hallucinations:  Denies and Not appearing to respond to internal stimuli at time of my interview   --Delusion:  None noted/overt  --Cognitive Functioning:  --Consciousness: awake and alert  Concentration:  Improving  --Reliability:  fair  --Insight:  Improving  --Judgment:  Improving  --Impulse Control:  Improving      Lab Results (last 24 hours)     ** No results found for the last 24 hours. ** "          Imaging Results (Last 24 Hours)     ** No results found for the last 24 hours. **            Medications:   Scheduled Meds:buPROPion XL, 150 mg, Oral, Daily  cholecalciferol, 2,000 Units, Oral, Daily With Dinner  mirtazapine, 15 mg, Oral, Nightly      Continuous Infusions:   PRN Meds:.•  acetaminophen  •  aluminum-magnesium hydroxide-simethicone  •  cloNIDine  •  hydrOXYzine pamoate  •  loperamide  •  magnesium hydroxide  •  melatonin  •  nicotine polacrilex      Assessment:     Suicide ideation    Acute depression    MDD (major depressive disorder), recurrent severe, without psychosis (CMS/HCC)    Stimulant use disorder, in recent remission, in a controlled environ    Rule Out Substance (Stimulant) induced mood disorder        Treatment Plan:  1) Will continue care for the patient on the behavioral health unit at [facility] to ensure patient safety.    2) Will continue to provide treatment with the unit milieu, activities, therapies and psychopharmacological management.    3) Patient to be placed on or continued on  Q15 minute checks  and Suicide precautions.    4) Pertinent Concurrent Non-Psychiatric Medical Issues:   --None significant or contributory.     5) Will order following labs: none    6) Behavioral Health Treatment Planning:  --Remeron 15mg QHS for mood and sleep  --Wellbutrin XL 150mg daily for affective sx  --Vit D3 @ 2k daily for supplementation    7) Psychotherapy provided: as below    --> Dispostion Planning: to rehab in next 1-2 days    Treatment plan and medication risks and benefits discussed with: Patient, Family and Treatment Team.    Giacomo Watts II, MD  11/15/20 @ 22:35 CST    Dictated using Dragon.

## 2020-11-16 NOTE — PROGRESS NOTES
"Psychiatry Progress Note    11/16/2020    Legal Status: Voluntary     Chief Complaint: suicidal ideation, self injurious behavior and depression    Subjective:  Patient is a 20 y.o. male who was hospitalized for suicidal ideation, suicide attempt and depression.    Patient had a good night, did not sleep very well but attributes it to, \"being excited.\" Patient was medically discharged from [facility] over the weekend and has received approval from the court to transfer to [facility]. Patient will be discharged in to sister's care today to make the transition to [facility].      Patient reports medications are effective.    Objective     Vital Signs    Vitals:    11/14/20 1900 11/15/20 0700 11/15/20 1900 11/16/20 0700   BP: 121/70 130/59 106/64 122/58   BP Location: Left arm Right arm Left arm Right arm   Patient Position: Sitting Lying Sitting Sitting   Pulse: 63 60 83 63   Resp: 18 18 18 18   Temp: 97.8 °F (36.6 °C) 97.4 °F (36.3 °C) 98.1 °F (36.7 °C) 97.2 °F (36.2 °C)   TempSrc: Tympanic Tympanic Tympanic Tympanic   SpO2: 98% 98% 97% 98%   Weight:       Height:           Physical Exam:   General Appearance: alert, appears stated age and cooperative,  Hygiene:   good  Gait & Station: Normal  Musculoskeletal: No tremors or abnormal involuntary movements    Mental Status Exam:   Cooperation:  Cooperative  Eye Contact:  Good  Psychomotor Behavior:  Appropriate  Mood: Euthymic  Affect:  normal  Speech:  Normal  Thought Process:  Coherent  Associations: Goal Directed  Thought Content:     Normal   Suicidal:  None   Homicidal:  None   Hallucinations:  None   Delusion:  None  Cognitive Functioning:   Consciousness: awake, alert and oriented, Orientation:  Person, Place and Situation and Attention: normal; Concentration: Normal  Reliability:  good  Insight:  Fair  Judgement:  Fair  Impulse Control:  Fair    Lab Results:  Lab Results (last 24 hours)     ** No results found for the last 24 hours. **    "       Radiology Results:  Imaging Results (Last 24 Hours)     ** No results found for the last 24 hours. **          Medicine:   Current Facility-Administered Medications:   •  acetaminophen (TYLENOL) tablet 650 mg, 650 mg, Oral, Q4H PRN, Og Treadwell MD  •  aluminum-magnesium hydroxide-simethicone (MAALOX MAX) 400-400-40 MG/5ML suspension 15 mL, 15 mL, Oral, Q6H PRN, Og Treadwell MD  •  [COMPLETED] buPROPion SR (WELLBUTRIN SR) 12 hr tablet 100 mg, 100 mg, Oral, Daily, 100 mg at 11/12/20 1226 **FOLLOWED BY** buPROPion XL (WELLBUTRIN XL) 24 hr tablet 150 mg, 150 mg, Oral, Daily, Giacomo Watts II, MD, 150 mg at 11/16/20 0806  •  cholecalciferol (VITAMIN D3) tablet 2,000 Units, 2,000 Units, Oral, Daily With Dinner, Giacomo Watts II, MD, 2,000 Units at 11/15/20 1723  •  cloNIDine (CATAPRES) tablet 0.1 mg, 0.1 mg, Oral, Q4H PRN, Og Treadwell MD  •  hydrOXYzine pamoate (VISTARIL) capsule 50 mg, 50 mg, Oral, Q6H PRN, Og Treadwell MD, 50 mg at 11/15/20 2018  •  loperamide (IMODIUM) capsule 2 mg, 2 mg, Oral, Q2H PRN, Og Treadwell MD  •  magnesium hydroxide (MILK OF MAGNESIA) suspension 2400 mg/10mL 10 mL, 10 mL, Oral, Daily PRN, Og Treadwell MD  •  melatonin tablet 1.5 mg, 1.5 mg, Oral, Daily PRN, Giacomo Watts II, MD, 1.5 mg at 11/15/20 2018  •  mirtazapine (REMERON) tablet 15 mg, 15 mg, Oral, Nightly, Giacomo Watts II, MD, 15 mg at 11/15/20 2018  •  nicotine polacrilex (NICORETTE) gum 2 mg, 2 mg, Mouth/Throat, Q2H PRN, Antonio Wattsald Laurel II, MD, 2 mg at 11/15/20 9250    Diagnoses/Assessment:     Acute depression    MDD (major depressive disorder), recurrent severe, without psychosis (CMS/HCC)    Stimulant use disorder, in recent remission, in a controlled environ    Rule Out Substance (Stimulant) induced mood disorder      Treatment Plan:    1) Will continue care for the patient on the behavioral health unit at [facility] to  ensure patient safety.  2) Will continue to provide treatment with the unit milieu, activities, therapies and psychopharmacological management.  3) Patient to be placed on or continued on  Q15 minute checks  and Suicide precautions.  4) Pertinent medical issues: none  5) Will order following labs: none  6) Will make the following medication changes: none  7) Will continue discharge planning as appropriate for patient, with anticipated d/c today.    Treatment plan and medication risks and benefits discussed with: Patient    Rosangela Lunabrecht, Medical Student  11/16/20 at 08:44 CST      ATTENDING NOTE:  See my note from today for full information, including MSE, A&P.      Giacomo Watts II, MD  11/23/20 @ 7:13 PM CST

## 2020-11-16 NOTE — PLAN OF CARE
Goal Outcome Evaluation:  Plan of Care Reviewed With: patient  Progress: improving  Outcome Summary: Patient denies S/I, H/I and AVH. Patient reports he is going to rehab  tomorrow. Patient is cooperative and compliant with medications. Patient appears to have slept the last 6 hours and is currently still sleeping.

## 2020-11-16 NOTE — NURSING NOTE
Behavior   Note any precipitants to event or behavior   Describe level and action of any aggressive behavior or speech and associated interventions.     Anxiety: Patient denies at this time  Depression: Patient denies at this time  Pain  0  AVH   no  S/I   no  Plan  no  H/I   no  Plan  no    Affect   blunted      Note: Patient in dining area interacting with peers. Patient denies S/I, H/I and AVH. Patient reports he is going to rehab  tomorrow. Patient is cooperative and compliant with medications.      Intervention    PRN medication utilized:  no    Instructed in medication usage and effects  Medications administered as ordered  Encouraged to verbalize needs      Response    Verbalized understanding   Did patient take medications as ordered yes   Did patient interact with assessment?  yes     Plan    Will monitor for safety  Will monitor every 15 minutes as ordered  Will evaluate and promote the plan of care    Last BM:    (Please chart in I/O as well)

## 2020-11-16 NOTE — DISCHARGE INSTRUCTIONS
--Continue medications as currently prescribed.  --Continue follow-up with outpatient providers.  --Please contact our Behavioral Health Unit with any questions or concerns at [phone].  --Return to the ER with any suicidal or homicidal ideation, or worsening symptoms.    --The number for the National Suicide & Crisis Hotline is 1-630.745.2114.  The National Crisis Text number is 397023.  These may be contacted at any time, if needed.

## 2020-11-16 NOTE — DISCHARGE SUMMARY
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      Chew 1 each Every 2 (Two) Hours As Needed for Smoking Cessation. Do not smoke/vape/dip within 30 min of use  Indications: Nicotine Addiction          STOP taking these medications    buPROPion  MG 12 hr tablet  Commonly known as: Wellbutrin SR  Replaced by: buPROPion  MG 24 hr tablet              Where to Get Your Medications      You can get these medications from any pharmacy    Bring a paper prescription for each of these medications  · buPROPion  MG 24 hr tablet  · cholecalciferol 25 MCG (1000 UT) tablet  · mirtazapine 15 MG tablet  · nicotine polacrilex 2 MG gum         Justification for multiple antipsychotic medications at discharge:    --Not Applicable.  Medication for smoking cessation:   --Patient agrees to prescription of Nicotine Gum  Medication for substance abuse:   --Patient has a substance use diagnosis but there is no FDA indicated medication to treat this diagnosis.    Discharge Diet:  As per pre-admission.  Activity Level:  As per pre-admission.    Disposition: Patient was discharged Substance use rehabilitation program, residential, at [facility], court ordered.    Outpatient Follow-Up:  Follow-up Information     Sugey White APRN .    Specialty: Nurse Practitioner           Kevin Vale. Go on 11/16/2020.    Why: Pt to transition to rehab  today    Call Crisis Hotline as needed at [phone]  Contact information:  [address] [place] [address] [phone]                 Time Spent: Less than 30 minutes.  I spent 25 minutes on this discharge activity which included: face to face encounter with the patient, reviewing the data in the system, coordination of the care with the nursing staff as well as consultants, documentation, and entering orders.      Giacomo Watts II, MD  11/16/20  08:40 CST

## 2020-11-16 NOTE — NURSING NOTE
Pt discharged [facility] to the [facility]. Pt is stable without any s/s of distress. Personal belongings returned. Discharge packet and prescriptions faxed to the [facility] and sent with the Pt. Pt signed and understands discharge instructions. Pt left the facility with sister Jorge via private vehicle.

## 2021-05-19 ENCOUNTER — APPOINTMENT (OUTPATIENT)
Dept: GENERAL RADIOLOGY | Age: 21
End: 2021-05-19
Payer: MEDICAID

## 2021-05-19 ENCOUNTER — HOSPITAL ENCOUNTER (EMERGENCY)
Age: 21
Discharge: ANOTHER HOSPITAL | End: 2021-05-19
Attending: EMERGENCY MEDICINE
Payer: MEDICAID

## 2021-05-19 VITALS
TEMPERATURE: 96.6 F | SYSTOLIC BLOOD PRESSURE: 72 MMHG | HEART RATE: 133 BPM | DIASTOLIC BLOOD PRESSURE: 42 MMHG | OXYGEN SATURATION: 100 % | RESPIRATION RATE: 20 BRPM

## 2021-05-19 DIAGNOSIS — S09.90XA CLOSED HEAD INJURY, INITIAL ENCOUNTER: Primary | ICD-10-CM
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
## 2021-05-19 LAB
ABO/RH: NORMAL
ALBUMIN SERPL-MCNC: 4 G/DL (ref 3.5–5.2)
ALP BLD-CCNC: 86 U/L (ref 40–130)
ALT SERPL-CCNC: 556 U/L (ref 5–41)
ANION GAP SERPL CALCULATED.3IONS-SCNC: 17 MMOL/L (ref 7–19)
ANTIBODY SCREEN: NORMAL
APTT: 52.6 SEC (ref 26–36.2)
AST SERPL-CCNC: 842 U/L (ref 5–40)
BASE EXCESS ARTERIAL: -4.9 MMOL/L (ref -2–2)
BASOPHILS ABSOLUTE: 0.1 K/UL (ref 0–0.2)
BASOPHILS RELATIVE PERCENT: 0.5 % (ref 0–1)
BILIRUB SERPL-MCNC: 0.7 MG/DL (ref 0.2–1.2)
BLOOD BANK DISPENSE STATUS: NORMAL
BLOOD BANK PRODUCT CODE: NORMAL
BPU ID: NORMAL
BUN BLDV-MCNC: 9 MG/DL (ref 6–20)
CALCIUM SERPL-MCNC: 8.7 MG/DL (ref 8.6–10)
CARBOXYHEMOGLOBIN ARTERIAL: 2.1 % (ref 0–5)
CHLORIDE BLD-SCNC: 103 MMOL/L (ref 98–111)
CO2: 20 MMOL/L (ref 22–29)
CREAT SERPL-MCNC: 1.1 MG/DL (ref 0.5–1.2)
DESCRIPTION BLOOD BANK: NORMAL
EOSINOPHILS ABSOLUTE: 0.1 K/UL (ref 0–0.6)
EOSINOPHILS RELATIVE PERCENT: 0.4 % (ref 0–5)
ETHANOL: <10 MG/DL (ref 0–0.08)
GFR AFRICAN AMERICAN: >59
GFR NON-AFRICAN AMERICAN: >60
GLUCOSE BLD-MCNC: 179 MG/DL (ref 74–109)
HCO3 ARTERIAL: 22.9 MMOL/L (ref 22–26)
HCT VFR BLD CALC: 37.9 % (ref 42–52)
HEMOGLOBIN, ART, EXTENDED: 8.8 G/DL (ref 14–18)
HEMOGLOBIN: 12.9 G/DL (ref 14–18)
IMMATURE GRANULOCYTES #: 0.7 K/UL
INR BLD: 1.72 (ref 0.88–1.18)
LYMPHOCYTES ABSOLUTE: 3.9 K/UL (ref 1.1–4.5)
LYMPHOCYTES RELATIVE PERCENT: 22.3 % (ref 20–40)
MAGNESIUM: 2.9 MG/DL (ref 1.6–2.6)
MCH RBC QN AUTO: 32.1 PG (ref 27–31)
MCHC RBC AUTO-ENTMCNC: 34 G/DL (ref 33–37)
MCV RBC AUTO: 94.3 FL (ref 80–94)
METHEMOGLOBIN ARTERIAL: 1 %
MONOCYTES ABSOLUTE: 0.7 K/UL (ref 0–0.9)
MONOCYTES RELATIVE PERCENT: 3.8 % (ref 0–10)
NEUTROPHILS ABSOLUTE: 12.2 K/UL (ref 1.5–7.5)
NEUTROPHILS RELATIVE PERCENT: 68.9 % (ref 50–65)
O2 CONTENT ARTERIAL: 9.6 ML/DL
O2 SAT, ARTERIAL: 76.9 %
O2 THERAPY: ABNORMAL
PCO2 ARTERIAL: 56 MMHG (ref 35–45)
PDW BLD-RTO: 12.7 % (ref 11.5–14.5)
PH ARTERIAL: 7.22 (ref 7.35–7.45)
PLATELET # BLD: 275 K/UL (ref 130–400)
PMV BLD AUTO: 10.4 FL (ref 9.4–12.4)
PO2 ARTERIAL: 46 MMHG (ref 80–100)
POTASSIUM REFLEX MAGNESIUM: 3.2 MMOL/L (ref 3.5–5)
POTASSIUM, WHOLE BLOOD: 2.5
PROTHROMBIN TIME: 20.4 SEC (ref 12–14.6)
RBC # BLD: 4.02 M/UL (ref 4.7–6.1)
SODIUM BLD-SCNC: 140 MMOL/L (ref 136–145)
TOTAL PROTEIN: 5.9 G/DL (ref 6.6–8.7)
WBC # BLD: 17.7 K/UL (ref 4.8–10.8)

## 2021-05-19 PROCEDURE — 83735 ASSAY OF MAGNESIUM: CPT

## 2021-05-19 PROCEDURE — 36600 WITHDRAWAL OF ARTERIAL BLOOD: CPT

## 2021-05-19 PROCEDURE — 99285 EMERGENCY DEPT VISIT HI MDM: CPT

## 2021-05-19 PROCEDURE — 80053 COMPREHEN METABOLIC PANEL: CPT

## 2021-05-19 PROCEDURE — 31500 INSERT EMERGENCY AIRWAY: CPT

## 2021-05-19 PROCEDURE — 36415 COLL VENOUS BLD VENIPUNCTURE: CPT

## 2021-05-19 PROCEDURE — 6360000002 HC RX W HCPCS: Performed by: EMERGENCY MEDICINE

## 2021-05-19 PROCEDURE — 86920 COMPATIBILITY TEST SPIN: CPT

## 2021-05-19 PROCEDURE — 82803 BLOOD GASES ANY COMBINATION: CPT

## 2021-05-19 PROCEDURE — 2580000003 HC RX 258: Performed by: EMERGENCY MEDICINE

## 2021-05-19 PROCEDURE — 2500000003 HC RX 250 WO HCPCS: Performed by: EMERGENCY MEDICINE

## 2021-05-19 PROCEDURE — 85730 THROMBOPLASTIN TIME PARTIAL: CPT

## 2021-05-19 PROCEDURE — 86850 RBC ANTIBODY SCREEN: CPT

## 2021-05-19 PROCEDURE — 85610 PROTHROMBIN TIME: CPT

## 2021-05-19 PROCEDURE — 86900 BLOOD TYPING SEROLOGIC ABO: CPT

## 2021-05-19 PROCEDURE — 71045 X-RAY EXAM CHEST 1 VIEW: CPT

## 2021-05-19 PROCEDURE — 84132 ASSAY OF SERUM POTASSIUM: CPT

## 2021-05-19 PROCEDURE — 92950 HEART/LUNG RESUSCITATION CPR: CPT

## 2021-05-19 PROCEDURE — 86901 BLOOD TYPING SEROLOGIC RH(D): CPT

## 2021-05-19 PROCEDURE — 36556 INSERT NON-TUNNEL CV CATH: CPT

## 2021-05-19 PROCEDURE — 86923 COMPATIBILITY TEST ELECTRIC: CPT

## 2021-05-19 PROCEDURE — 82077 ASSAY SPEC XCP UR&BREATH IA: CPT

## 2021-05-19 PROCEDURE — 36430 TRANSFUSION BLD/BLD COMPNT: CPT

## 2021-05-19 PROCEDURE — 76705 ECHO EXAM OF ABDOMEN: CPT

## 2021-05-19 PROCEDURE — P9016 RBC LEUKOCYTES REDUCED: HCPCS

## 2021-05-19 PROCEDURE — 85025 COMPLETE CBC W/AUTO DIFF WBC: CPT

## 2021-05-19 RX ORDER — VECURONIUM BROMIDE 1 MG/ML
10 INJECTION, POWDER, LYOPHILIZED, FOR SOLUTION INTRAVENOUS ONCE
Status: COMPLETED | OUTPATIENT
Start: 2021-05-19 | End: 2021-05-19

## 2021-05-19 RX ORDER — ETOMIDATE 2 MG/ML
20 INJECTION INTRAVENOUS ONCE
Status: COMPLETED | OUTPATIENT
Start: 2021-05-19 | End: 2021-05-19

## 2021-05-19 RX ORDER — NOREPINEPHRINE BIT/0.9 % NACL 16MG/250ML
INFUSION BOTTLE (ML) INTRAVENOUS CONTINUOUS PRN
Status: COMPLETED | OUTPATIENT
Start: 2021-05-19 | End: 2021-05-19

## 2021-05-19 RX ORDER — EPINEPHRINE 0.1 MG/ML
SYRINGE (ML) INJECTION DAILY PRN
Status: COMPLETED | OUTPATIENT
Start: 2021-05-19 | End: 2021-05-19

## 2021-05-19 RX ADMIN — ETOMIDATE 20 MG: 20 INJECTION, SOLUTION INTRAVENOUS at 05:37

## 2021-05-19 RX ADMIN — Medication 1 MG: at 06:00

## 2021-05-19 RX ADMIN — VECURONIUM BROMIDE 10 MG: 1 INJECTION, POWDER, LYOPHILIZED, FOR SOLUTION INTRAVENOUS at 05:38

## 2021-05-19 RX ADMIN — Medication 25 MCG/MIN: at 06:10

## 2021-05-19 RX ADMIN — TRANEXAMIC ACID 1000 MG: 1 INJECTION, SOLUTION INTRAVENOUS at 06:38

## 2021-05-19 NOTE — CODE DOCUMENTATION
Pt bleeding out of mouth and still arterial bleed to L of head. Large amount of blood being suctioned out of mouth at this time.

## 2021-05-19 NOTE — PROGRESS NOTES
Results for Julia Joy (MRN [id number]) as of 5/19/2021 06:25   Ref.  Range 5/19/2021 06:06   Hemoglobin, Art, Extended Latest Ref Range: 14.0 - 18.0 g/dL 8.8 (L)   pH, Arterial Latest Ref Range: 7.350 - 7.450  7.220 (LL)   pCO2, Arterial Latest Ref Range: 35.0 - 45.0 mmHg 56.0 (H)   pO2, Arterial Latest Ref Range: 80.0 - 100.0 mmHg 46.0 (LL)   HCO3, Arterial Latest Ref Range: 22.0 - 26.0 mmol/L 22.9   Base Excess, Arterial Latest Ref Range: -2.0 - 2.0 mmol/L -4.9 (L)   O2 Sat, Arterial Latest Ref Range: >92 % 76.9 (LL)   O2 Content, Arterial Latest Ref Range: Not Established mL/dL 9.6   Methemoglobin, Arterial Latest Ref Range: <1.5 % 1.0   Carboxyhgb, Arterial Latest Ref Range: 0.0 - 5.0 % 2.1   Pt on vent settings ACVC 14, , 5 peep and 100% Fio2, LR, AT+
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
## 2021-05-19 NOTE — ED NOTES
Weather check with Air vac at 6562. Air vac on standy by.    Air vac on way 0009 eta 18minutes     Zari Lind  05/19/21 3031

## 2021-05-19 NOTE — ED NOTES
Emergency blood started x2 units. Confirmed x2 nurses.      [facility]  05/19/21 5116

## 2021-05-19 NOTE — PROCEDURES
Consulted for or Contacted for need for an arterial line    Patient informed consent NOT obtained prior to procedure as life-threatening emergency. The patient was positioned. Sterile precautions were taken including but not necessarily limited to: mask, gloves. The target vessel was entered with aspiration of bright arterial blood, there was a weak but pulsatile flow from the finder needle after the syringe was detatched from the needle. The guidewire was inserted through the finder needle. The hollow bore needle was removed while the guidewire was held in place. The arterial line was then held in place while the guidwire was removed. The arterial line was then sutured on to place. The connector for the monitoring device was then attached by me. The biopatch is to be applied by the ED RN team.  The sterile dressing will then be applied by the ED RN team.  They agreed to help wrap up as I was called to an emergency in the ICU. Total Attempts: 1  Estimated Aspirated Blood Loss: 3cc  Estimated Total Blood Loss: 8cc      This procedure is not included  as a portion of another charge, and will be billed seperately.

## 2021-05-19 NOTE — ED NOTES
x2 units of emergency blood sent with Air Evac.      [facility]  05/19/21 4506

## 2022-03-08 NOTE — SIGNIFICANT NOTE
"ALLERGY & IMMUNOLOGY CLINIC - INITIAL CONSULTATION     HISTORY OF PRESENT ILLNESS     Patient ID: Issa Patino is a 30 y.o. 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    HPI: Issa Patino is a 30 y.o. 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  He was referred by Chayo Rm MD.    He says he gets symptoms with certain foods and drinks with PEG, and so he avoids these things (like sodas and cake). When I pointed out that polyethylene glycol is not typically in food or drink, we discovered that he actually avoids foods with propylene glycol, not polyethylene glycol.    He says he stopped drinking sodas a long time ago and also baked foods like cakes and pies. He says it never agreed with his stomach and his throat would feel "inflamed". He said the symptoms would start within 20-30 minutes. No urticaria. He says he was going straight to the bathroom with loose stools. He says sometimes he felt nauseated, but no vomiting. No shortness of breath or wheezing. But says throat would become sore and hard to swallow, and he might feel like the throat was affecting breathing. These symptoms would last for up to a day. It has happened with cokes and pepsis. It could also happen with store baked cake or home baked cake. He checks ingredient labels to avoid these foods. He says since avoiding, his symptoms have improved. He has never tried taking an antihistamine for it.    He says with eggs, his throat swells up. It feels "inflamed". No other symptoms. No visible swelling. Symptoms not as severe with baked egg, but he does not eat a lot of baked products in general due to his concern for propylene glycol allergy. He does not eat waffles. He says pasta does not agree with his stomach, and he has to go straight to the bathroom. Makes him feel bloated. He says he thinks the last time he had bread or pasta was about a week ago when he " LCSW met with pt 1:1 and reviewed safety/dc plan. Pt presented to the interview room, casually dressed, alert and oriented x3. Mood is good, affect is bright. Pt makes good eye contact, speech is normal. Pt is pleasant and cooperative. Pt is hopeful about dc today ,says that he is looking forward to transitioning to a new rehab. Plan is for pt to go to [facility] for long term recovery program today. Confirmed with pt's sister that she will providing transport. Pt educated on Crisis Hotline, advised to call as needed. Pt to have appropriate follow up once arriving at [facility]. Pt does appear to be at baseline and stable to dc. Does not appear to be an imminent risk to self or others. Pt does not have access to firearms. Pt participated well in individual and group tx, was med compliant. Denies SI/HI, AVH. Will dc via sister today.    "had a sandwich. He does not eat cheese or dairy because he feels his skin is clearer without it. He says the last time he had egg was years ago. He says his symptom with egg developed in adulthood about 10 or so years ago. He does not have an epi pen, but he carried one in the . He has no interest in reintroducing egg.    He says he has not had the flu vaccine in a few years due to the egg allergy. He says in the past when he has had the flu vaccine, he feels "inflamed" and "horrible," but no allergic reaction.      Vaccines:   Immunization History   Administered Date(s) Administered    Anthrax 06/03/2019    Hepatitis A / Hepatitis B 08/21/2009, 10/09/2009    Hepatitis A, Adult 03/12/2010    Hepatitis B, Adult 05/23/2010    IPV 08/21/2009    Influenza 10/22/2011    Influenza - Intranasal - Trivalent 10/05/2009    Influenza - Quadrivalent - PF *Preferred* (6 months and older) 02/06/2019    Influenza - Trivalent (ADULT) 09/27/2014    Influenza - Trivalent - MDCK - PF 12/14/2013    Influenza - Trivalent - PF (ADULT) 10/20/2012, 10/18/2015, 09/10/2016, 01/09/2018    Influenza A (H1N1) 2009 Monovalent - IM 12/15/2009    MMR 04/18/2015, 10/18/2015    Meningococcal Conjugate (MCV4P) 08/21/2009    PPD Test 08/19/2009, 06/04/2012    Td (ADULT) 03/09/2020    Tdap 08/21/2009    Typhoid - ViCPs 08/20/2011, 06/03/2019    Varicella 04/18/2015, 10/18/2015        REVIEW OF SYSTEMS     CONST: no F/C/NS, no unexplained weight changes  PULM: no SOB, no wheezing, no cough  DERM: no rashes, no skin breaks     MEDICAL HISTORY     MedHx:   There is no problem list on file for this patient.      SurgHx:   History reviewed. No pertinent surgical history.    Medications:   No current outpatient medications on file prior to visit.     No current facility-administered medications on file prior to visit.       H/o Asthma: denies  H/o Rhinitis: endorses    Drug Allergies:   Review of patient's allergies indicates: " "  Allergen Reactions    Egg derived      Tongue swelling    Influenza virus vaccines         Env/Occ:   Pets: no  Occupation: He builds houses. Previously in .    SocHx:   ETOH: "every now and then"  Tobacco: He says he smokes a hookah every now and then  Illicit Drug Use: no  Social History     Tobacco Use    Smoking status: Light Tobacco Smoker    Smokeless tobacco: Never Used   Substance Use Topics    Alcohol use: Not Currently       FamHx:   Asthma: sister  Allergic rhinitis: no  Food Allergy: sister with allergy to shellfish  Immune deficiency:    Family History   Problem Relation Age of Onset    No Known Problems Mother     No Known Problems Father     No Known Problems Sister     No Known Problems Brother     No Known Problems Sister     No Known Problems Sister     Allergies Neg Hx         PHYSICAL EXAM     VS: Ht 5' 10" (1.778 m)   Wt 77.9 kg (171 lb 11.8 oz)   BMI 24.64 kg/m²   GENERAL: Alert, NAD, well-appearing, cooperative  EYES: EOMI, no conjunctival injection, no discharge, no infraorbital shiners  EARS: external auditory canals normal B/L, TM normal B/L  NOSE: NT 3+ B/L, no stringing mucous, no polyps visualized  ORAL: MMM, no ulcers, no thrush, + cobblestoning  NECK: no thyromegaly, no LAD  LUNGS: CTAB, no w/r/c, no increased WOB  HEART: RRR, normal S1/S2, no m/g/r  ABDOMEN: BS present, soft, non-tender, non-distended  EXTREMITIES: No LE edema  DERM: no rashes, no skin breaks  NEURO: normal speech, normal gait, no facial asymmetry     LABORATORY STUDIES     Ordered celiac screen.     ALLERGEN TESTING     Immunocaps: Ordered for egg.     CHART REVIEW     Reviewed pcp note.     ASSESSMENT & PLAN     Issa Patino is a 30 y.o. male with     # Concern for propylene glycol allergy or intolerance: Symptoms of sensation of throat swelling, bowel movement urgency, loose stools. Symptoms can start within 20-30 minutes and last for a day. He avoids sodas, baked products, and anything " "with propylene glycol. Propylene glycol can be a culprit for contact dermatitis, but I have not been able to find any reports of IgE-mediated allergy to propylene glycol. I reassured patient that symptoms lasting for a day are generally not consistent with IgE-mediated allergy. Offered [id number] skin testing with soda, but explained that this sort of testing is not validated. Patient will treat it as an intolerance and continue to avoid.   Many of the foods he says trigger symptoms also contain gluten (except sodas). He last had bread about a week ago. Will do celiac disease screen with TTG IgA.  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 He thought his history of intolerance to foods with propylene glycol might preclude him from getting a covid vaccine with PEG. Counseled patient that these are 2 different chemical and they do not typically cross-react when it comes to allergy.  -okay to proceed with mRNA covid vaccine    # History of egg allergy: Patient reports sensation of throat swelling. He says symptoms developed in adulthood and he has had positive allergy testing to egg in the past. Patient is not interested in reintroducing egg into his diet, but will do testing to determine if epi pen is necessary.  -immunocaps for egg ordered  -if negative, patient can continue avoidance per his preference  -if positive, will prescribe epi pen    # Label of flu vaccine allergy: He has never had immediate reaction to flu vaccine. It is on his allergy list due to egg allergy. I explained that people with egg allergy can tolerate flu vaccine. I advised that he let me remove this from his allergy list. He insists on keeping it on his allergy list because the flu vaccine makes him feel "horrible."    Follow up: as needed    I spent a total of 60 minutes on the day of the visit.  This includes face to " face time and non-face to face time preparing to see the patient (eg, review of tests), obtaining and/or reviewing separately obtained history, documenting clinical information in the electronic or other health record, independently interpreting results and communicating results to the patient/family/caregiver, or care coordinator.      Annette Peterson MD  Allergy/Immunology

## 2024-11-30 NOTE — PROGRESS NOTES
Pt called and spoke with staff at [facility] and completed phone assessment. PT has been accepted at [facility] for treatment on Monday, 11/16/20. LCSW spoke with pt's sister who plans to take pt to facility once released from [facility]. LCSW to follow up Monday to facilitate transition.    36.6